# Patient Record
Sex: MALE | Race: WHITE | NOT HISPANIC OR LATINO | Employment: OTHER | URBAN - METROPOLITAN AREA
[De-identification: names, ages, dates, MRNs, and addresses within clinical notes are randomized per-mention and may not be internally consistent; named-entity substitution may affect disease eponyms.]

---

## 2017-05-08 ENCOUNTER — HISTORICAL (OUTPATIENT)
Dept: ADMINISTRATIVE | Facility: HOSPITAL | Age: 72
End: 2017-05-08

## 2017-05-08 LAB
ALBUMIN SERPL-MCNC: 4.1 G/DL (ref 3.1–4.7)
ALP SERPL-CCNC: 61 IU/L (ref 40–104)
ALT (SGPT): 18 IU/L (ref 3–33)
AST SERPL-CCNC: 27 IU/L (ref 10–40)
BASOPHILS NFR BLD: 0.1 K/UL (ref 0–0.2)
BASOPHILS NFR BLD: 1.6 %
BILIRUB SERPL-MCNC: 0.8 MG/DL (ref 0.3–1)
BUN SERPL-MCNC: 19 MG/DL (ref 8–20)
CALCIUM SERPL-MCNC: 8.8 MG/DL (ref 7.7–10.4)
CHLORIDE: 105 MMOL/L (ref 98–110)
CO2 SERPL-SCNC: 29.3 MMOL/L (ref 22.8–31.6)
CREATININE: 0.81 MG/DL (ref 0.6–1.4)
CRP SERPL-MCNC: 0.06 MG/DL (ref 0–1.4)
EOSINOPHIL NFR BLD: 0.1 K/UL (ref 0–0.7)
EOSINOPHIL NFR BLD: 1.7 %
ERYTHROCYTE [DISTWIDTH] IN BLOOD BY AUTOMATED COUNT: 12.4 % (ref 11.7–14.9)
GLUCOSE: 81 MG/DL (ref 70–99)
GRAN #: 2.6 K/UL (ref 1.4–6.5)
GRAN%: 50.6 %
HCT VFR BLD AUTO: 41.9 % (ref 39–55)
HGB BLD-MCNC: 14.4 G/DL (ref 14–16)
IMMATURE GRANS (ABS): 0 K/UL (ref 0–1)
IMMATURE GRANULOCYTES: 0.4 %
LYMPH #: 1.8 K/UL (ref 1.2–3.4)
LYMPH%: 34.1 %
MCH RBC QN AUTO: 34.5 PG (ref 25–35)
MCHC RBC AUTO-ENTMCNC: 34.4 G/DL (ref 31–36)
MCV RBC AUTO: 100.5 FL (ref 80–100)
MONO #: 0.6 K/UL (ref 0.1–0.6)
MONO%: 11.6 %
NUCLEATED RBCS: 0 %
PLATELET # BLD AUTO: 142 K/UL (ref 140–440)
PMV BLD AUTO: 13.3 FL (ref 8.8–12.7)
POTASSIUM SERPL-SCNC: 4 MMOL/L (ref 3.5–5)
PROT SERPL-MCNC: 7 G/DL (ref 6–8.2)
RBC # BLD AUTO: 4.17 M/UL (ref 4.3–5.9)
SODIUM: 141 MMOL/L (ref 134–144)
WBC # BLD AUTO: 5.2 K/UL (ref 5–10)

## 2017-10-16 ENCOUNTER — OFFICE VISIT (OUTPATIENT)
Dept: RHEUMATOLOGY | Facility: CLINIC | Age: 72
End: 2017-10-16
Payer: MEDICARE

## 2017-10-16 VITALS
BODY MASS INDEX: 28.96 KG/M2 | DIASTOLIC BLOOD PRESSURE: 80 MMHG | SYSTOLIC BLOOD PRESSURE: 138 MMHG | WEIGHT: 218.5 LBS | HEIGHT: 73 IN

## 2017-10-16 DIAGNOSIS — Z79.899 ENCOUNTER FOR LONG-TERM (CURRENT) DRUG USE: ICD-10-CM

## 2017-10-16 DIAGNOSIS — M35.00 SJOGREN'S SYNDROME, WITH UNSPECIFIED ORGAN INVOLVEMENT: ICD-10-CM

## 2017-10-16 DIAGNOSIS — M35.9 CONNECTIVE TISSUE DISEASE, UNDIFFERENTIATED: Primary | ICD-10-CM

## 2017-10-16 LAB
ALBUMIN SERPL-MCNC: 3.8 G/DL (ref 3.1–4.7)
ALP SERPL-CCNC: 64 IU/L (ref 40–104)
ALT (SGPT): 21 IU/L (ref 3–33)
AST SERPL-CCNC: 28 IU/L (ref 10–40)
BASOPHILS NFR BLD: 0.1 K/UL (ref 0–0.2)
BASOPHILS NFR BLD: 1.4 %
BILIRUB SERPL-MCNC: 0.8 MG/DL (ref 0.3–1)
BUN SERPL-MCNC: 15 MG/DL (ref 8–20)
CALCIUM SERPL-MCNC: 9 MG/DL (ref 7.7–10.4)
CHLORIDE: 106 MMOL/L (ref 98–110)
CO2 SERPL-SCNC: 29.8 MMOL/L (ref 22.8–31.6)
CREATININE: 0.81 MG/DL (ref 0.6–1.4)
CRP SERPL-MCNC: 0.17 MG/DL (ref 0–1.4)
EOSINOPHIL NFR BLD: 0.2 K/UL (ref 0–0.7)
EOSINOPHIL NFR BLD: 3.3 %
ERYTHROCYTE [DISTWIDTH] IN BLOOD BY AUTOMATED COUNT: 12.9 % (ref 11.7–14.9)
GLUCOSE: 80 MG/DL (ref 70–99)
GRAN #: 2.6 K/UL (ref 1.4–6.5)
GRAN%: 51.3 %
HCT VFR BLD AUTO: 40.8 % (ref 39–55)
HGB BLD-MCNC: 14 G/DL (ref 14–16)
IMMATURE GRANS (ABS): 0 K/UL (ref 0–1)
IMMATURE GRANULOCYTES: 0.4 %
LYMPH #: 1.6 K/UL (ref 1.2–3.4)
LYMPH%: 32.2 %
MCH RBC QN AUTO: 35.4 PG (ref 25–35)
MCHC RBC AUTO-ENTMCNC: 34.3 G/DL (ref 31–36)
MCV RBC AUTO: 103 FL (ref 80–100)
MONO #: 0.6 K/UL (ref 0.1–0.6)
MONO%: 11.4 %
NUCLEATED RBCS: 0 %
PLATELET # BLD AUTO: 140 K/UL (ref 140–440)
PMV BLD AUTO: 13.1 FL (ref 8.8–12.7)
POTASSIUM SERPL-SCNC: 4.3 MMOL/L (ref 3.5–5)
PROT SERPL-MCNC: 6.5 G/DL (ref 6–8.2)
RBC # BLD AUTO: 3.96 M/UL (ref 4.3–5.9)
SODIUM: 143 MMOL/L (ref 134–144)
WBC # BLD AUTO: 5.1 K/UL (ref 5–10)

## 2017-10-16 PROCEDURE — 99213 OFFICE O/P EST LOW 20 MIN: CPT | Mod: ,,, | Performed by: INTERNAL MEDICINE

## 2017-10-16 NOTE — PROGRESS NOTES
[facility] RHEUMATOLOGY           Follow-up visit      Subjective:       Patient ID:   NAME: Nigel Marcus Jr. : 1945     71 y.o. male    Referring Doc: No ref. provider found  Other Physicians:    Chief Complaint:  Sjogren's syndrome      HPI/Interval History:    The patient has no interval complaints. He has not experienced any significant dryness of the eyes or of the mouth. He has had no pulmonary or GI complaints.          ROS:   GEN: no fever, night sweats or weight loss  SKIN:  no rashes , erythema, bruising, or swelling, no Raynauds, no photosensitivity  HEENT: no HAs, no changes in vision, no mouth ulcers, no sicca symptoms, no scalp tenderness, jaw claudication.  CV: no CP, SOB, PND, CHAPARRO or orthopnea,no palpitations  PULM: no SOB, cough, hemoptysis, sputum or pleuritic pain  GI: no abdominal pain, nausea, vomiting, constipation, diarrhea, melanotic stools, BRBPR, or hematemesis.no dysphagia  : no hematuria, dysuria  NEURO:no paresthesias, headaches, visual disturbances, muscle weakness  MUSCULOSKELETAL:  no complaints of joint or muscle pain  PSYCH: No insomnia, no significant depression, no anxiety    Medications:    Current Outpatient Prescriptions:     cevimeline (EVOXAC) 30 mg capsule, Take 30 mg by mouth 2 (two) times daily.  , Disp: , Rfl:     cycloSPORINE (RESTASIS) 0.05 % ophthalmic emulsion, Place 1 drop into both eyes 2 (two) times daily.  , Disp: , Rfl:     hydroxychloroquine (PLAQUENIL) 200 mg tablet, Take 200 mg by mouth once daily.  , Disp: , Rfl:     hyoscyamine (SYMAX-SL) 0.125 mg Subl, SYMAX-SL 0.125 MG SUBL, Disp: , Rfl:     latanoprost (XALATAN) 0.005 % ophthalmic solution, Place 1 drop into both eyes every evening.  , Disp: , Rfl:     omeprazole (PRILOSEC) 20 MG capsule, Take 20 mg by mouth once daily.  , Disp: , Rfl:     VOLTAREN 1 % Gel, , Disp: , Rfl: 3  FAMILY HISTORY: negative for Connective Tissue Disease        Review of patient's allergies indicates:   Allergen  "Reactions    Sulfa (sulfonamide antibiotics) Other (See Comments)     Ringing in ears             Objective:     Vitals:  Blood pressure 138/80, height 6' 1" (1.854 m), weight 99.1 kg (218 lb 8 oz).    Physical Examination:   GEN: wn/wd male in no apparent distress; AAOx3  SKIN: no rashes, no lesions, no sclerodactyly, no Raynaud's, no periungual erythema  HEAD: no alopecia, no scalp tenderness, no temporal artery tenderness or induration.  EYES: no pallor, no icterus, PERRLA  ENT:  no thrush, no mucosal dryness or ulcerations, adequate oral hygiene & dentition.  NECK: supple x 6, no masses, no thyromegaly, no lymphadenopathy.  CV:   S1 and S2 regular, no murmurs, gallop or rubs  CHEST: Normal respiratory effort;  normal breath sounds/no adventitious sounds. No signs of consolidation.  ABD: non-tender and non-distended; soft; normal bowel sounds; no rebound/guarding or tenderness. No hepatosplenomegaly.  Musculoskeletal:  No evidence of any inflammatory arthritis or of any significant degenerative disease  EXTREM: no clubbing, cyanosis or edema. normal pulses.  NEURO: grossly intact; motor/sensory WNL; AAOx3; no tremors  PSYCH: normal mood, affect and behavior            Labs:   Lab Results   Component Value Date    WBC 5.2 05/08/2017    HGB 14.4 05/08/2017    HCT 41.9 05/08/2017    .5 (H) 05/08/2017     05/08/2017   CMP@  Sodium   Date Value Ref Range Status   09/05/2017 141 136 - 145 mmol/L Final   05/08/2017 141 134 - 144 mmol/L      Potassium   Date Value Ref Range Status   09/05/2017 4.4 3.5 - 5.1 mmol/L Final   08/31/2015 4.3 3.5 - 5.1 MMOL/L Final     Chloride   Date Value Ref Range Status   09/05/2017 105 95 - 110 mmol/L Final   05/08/2017 105 98 - 110 mmol/L      CO2   Date Value Ref Range Status   09/05/2017 29 22 - 31 mmol/L Final     Glucose   Date Value Ref Range Status   09/05/2017 82 70 - 110 mg/dL Final     Comment:     The ADA recommends the following guidelines for fasting " glucose:  Normal:       less than 100 mg/dL  Prediabetes:  100 mg/dL to 125 mg/dL  Diabetes:     126 mg/dL or higher     05/08/2017 81 70 - 99 mg/dL      BUN, Bld   Date Value Ref Range Status   09/05/2017 17 9 - 21 mg/dL Final     Creatinine   Date Value Ref Range Status   09/05/2017 0.89 0.50 - 1.40 mg/dL Final   05/08/2017 0.81 0.60 - 1.40 mg/dL      Calcium   Date Value Ref Range Status   09/05/2017 9.0 8.4 - 10.2 mg/dL Final     Total Protein   Date Value Ref Range Status   09/05/2017 6.4 6.0 - 8.4 g/dL Final     Albumin   Date Value Ref Range Status   09/05/2017 3.7 3.5 - 5.2 g/dL Final   05/08/2017 4.1 3.1 - 4.7 g/dL      Total Bilirubin   Date Value Ref Range Status   09/05/2017 0.8 0.2 - 1.3 mg/dL Final     Alkaline Phosphatase   Date Value Ref Range Status   09/05/2017 69 38 - 145 U/L Final     AST   Date Value Ref Range Status   09/05/2017 29 17 - 59 U/L Final     ALT   Date Value Ref Range Status   09/05/2017 37 10 - 44 U/L Final     CRP   Date Value Ref Range Status   05/08/2017 0.06 0.00 - 1.40 mg/dL          Radiology/Diagnostic Studies:    No x-rays are available    Assessment/Discussion/Plan:   71 y.o. male with Sjogren's-stable on hydroxychloroquine         PLAN:   I will continue his medication without change. Routine blood testing was ordered today.      RTC:   I will see him back in 4 months.      Electronically signed by Nic Piña MD

## 2018-01-18 RX ORDER — HYDROXYCHLOROQUINE SULFATE 200 MG/1
TABLET, FILM COATED ORAL
Qty: 90 TABLET | Refills: 3 | Status: SHIPPED | OUTPATIENT
Start: 2018-01-18 | End: 2019-02-01 | Stop reason: SDUPTHER

## 2018-02-26 ENCOUNTER — OFFICE VISIT (OUTPATIENT)
Dept: RHEUMATOLOGY | Facility: CLINIC | Age: 73
End: 2018-02-26
Payer: MEDICARE

## 2018-02-26 VITALS
BODY MASS INDEX: 29.03 KG/M2 | DIASTOLIC BLOOD PRESSURE: 88 MMHG | SYSTOLIC BLOOD PRESSURE: 158 MMHG | HEIGHT: 73 IN | WEIGHT: 219 LBS

## 2018-02-26 DIAGNOSIS — Z79.899 ENCOUNTER FOR LONG-TERM (CURRENT) DRUG USE: ICD-10-CM

## 2018-02-26 DIAGNOSIS — M35.00 SJOGREN'S SYNDROME, WITH UNSPECIFIED ORGAN INVOLVEMENT: Primary | ICD-10-CM

## 2018-02-26 DIAGNOSIS — I49.3 PVC (PREMATURE VENTRICULAR CONTRACTION): ICD-10-CM

## 2018-02-26 DIAGNOSIS — K22.4 ESOPHAGEAL SPASM: ICD-10-CM

## 2018-02-26 LAB
ALBUMIN SERPL-MCNC: 3.8 G/DL (ref 3.1–4.7)
ALP SERPL-CCNC: 71 IU/L (ref 40–104)
ALT (SGPT): 24 IU/L (ref 3–33)
AST SERPL-CCNC: 27 IU/L (ref 10–40)
BASOPHILS NFR BLD: 0.1 K/UL (ref 0–0.2)
BASOPHILS NFR BLD: 1.2 %
BILIRUB SERPL-MCNC: 0.9 MG/DL (ref 0.3–1)
BUN SERPL-MCNC: 16 MG/DL (ref 8–20)
CALCIUM SERPL-MCNC: 9.1 MG/DL (ref 7.7–10.4)
CHLORIDE: 106 MMOL/L (ref 98–110)
CO2 SERPL-SCNC: 29.5 MMOL/L (ref 22.8–31.6)
CREATININE: 0.81 MG/DL (ref 0.6–1.4)
CRP SERPL-MCNC: 0.07 MG/DL (ref 0–1.4)
EOSINOPHIL NFR BLD: 0.1 K/UL (ref 0–0.7)
EOSINOPHIL NFR BLD: 2 %
ERYTHROCYTE [DISTWIDTH] IN BLOOD BY AUTOMATED COUNT: 12.2 % (ref 11.7–14.9)
GLUCOSE: 78 MG/DL (ref 70–99)
GRAN #: 2.9 K/UL (ref 1.4–6.5)
GRAN%: 56.9 %
HCT VFR BLD AUTO: 42.8 % (ref 39–55)
HGB BLD-MCNC: 14.9 G/DL (ref 14–16)
IMMATURE GRANS (ABS): 0 K/UL (ref 0–1)
IMMATURE GRANULOCYTES: 0.2 %
LYMPH #: 1.5 K/UL (ref 1.2–3.4)
LYMPH%: 30.1 %
MCH RBC QN AUTO: 35.2 PG (ref 25–35)
MCHC RBC AUTO-ENTMCNC: 34.8 G/DL (ref 31–36)
MCV RBC AUTO: 101.2 FL (ref 80–100)
MONO #: 0.5 K/UL (ref 0.1–0.6)
MONO%: 9.6 %
NUCLEATED RBCS: 0 %
PLATELET # BLD AUTO: 151 K/UL (ref 140–440)
PMV BLD AUTO: 13.1 FL (ref 8.8–12.7)
POTASSIUM SERPL-SCNC: 4.1 MMOL/L (ref 3.5–5)
PROT SERPL-MCNC: 6.4 G/DL (ref 6–8.2)
RBC # BLD AUTO: 4.23 M/UL (ref 4.3–5.9)
SODIUM: 140 MMOL/L (ref 134–144)
WBC # BLD AUTO: 5 K/UL (ref 5–10)

## 2018-02-26 PROCEDURE — 1126F AMNT PAIN NOTED NONE PRSNT: CPT | Mod: ,,, | Performed by: INTERNAL MEDICINE

## 2018-02-26 PROCEDURE — 99213 OFFICE O/P EST LOW 20 MIN: CPT | Mod: ,,, | Performed by: INTERNAL MEDICINE

## 2018-02-26 PROCEDURE — 1159F MED LIST DOCD IN RCRD: CPT | Mod: ,,, | Performed by: INTERNAL MEDICINE

## 2018-02-26 NOTE — PROGRESS NOTES
[facility] RHEUMATOLOGY           Follow-up visit      Subjective:       Patient ID:   NAME: Nigel Marcus Jr. : 1945     72 y.o. male    Referring Doc: No ref. provider found  Other Physicians:    Chief Complaint:  No chief complaint on file.      HPI/Interval History:   The patient is basically doing well. He is a bit concerned about his recent diagnosis of PVCs. He did have a full cardio workup and nothing more serious was revealed and no antiarrhythmic medication was prescribed. He is not having any difficulty with his Sjogren's.  He has also been diagnosed with esophageal spasm. The gastroenterologist placed him on doxepin which is apparently helpful.        ROS:   GEN:    no fever, night sweats or weight loss  SKIN:   no rashes , erythema, bruising, or swelling, no Raynauds, no photosensitivity  HEENT: no HAs, no changes in vision, no mouth ulcers, no sicca symptoms, no scalp tenderness, jaw claudication.  CV:      no CP, SOB, PND, CHAPARRO or orthopnea, occ palpitations  PULM: no SOB, cough, hemoptysis, sputum or pleuritic pain  GI:      no abdominal pain, nausea, vomiting, constipation, diarrhea, melanotic stools, BRBPR, or hematemesis, no dysphagia, no GERD  :     no hematuria, dysuria  NEURO: no paresthesias, headaches, visual disturbances  MUSCULOSKELETAL:  No muscle or joint pain  PSYCH:   No insomnia, no significant depression, no anxiety    Medications:    Current Outpatient Prescriptions:     cevimeline (EVOXAC) 30 mg capsule, Take 30 mg by mouth 2 (two) times daily.  , Disp: , Rfl:     cycloSPORINE (RESTASIS) 0.05 % ophthalmic emulsion, Place 1 drop into both eyes 2 (two) times daily.  , Disp: , Rfl:     doxepin (SINEQUAN) 10 MG capsule, Take 10 mg by mouth every evening., Disp: , Rfl:     hydroxychloroquine (PLAQUENIL) 200 mg tablet, TAKE 1 TABLET DAILY, Disp: 90 tablet, Rfl: 3    hyoscyamine (LEVSIN/SL) 0.125 mg Subl, Place 0.125 mg under the tongue every 4 (four) hours as needed., Disp:  ", Rfl:     latanoprost (XALATAN) 0.005 % ophthalmic solution, Place 1 drop into both eyes every evening.  , Disp: , Rfl:     metoprolol succinate (TOPROL-XL) 25 MG 24 hr tablet, Take 1 tablet (25 mg total) by mouth once daily., Disp: 90 tablet, Rfl: 1    MULTIVITAMIN/IRON/FOLIC ACID (CENTRUM COMPLETE ORAL), Take 1 tablet by mouth once daily., Disp: , Rfl:     omega-3 fatty acids (FISH OIL) 500 mg Cap, Take by mouth., Disp: , Rfl:     omeprazole (PRILOSEC) 20 MG capsule, Take 20 mg by mouth once daily.  , Disp: , Rfl:     vitamin E 400 UNIT capsule, Take 400 Units by mouth once daily., Disp: , Rfl:     VOLTAREN 1 % Gel, , Disp: , Rfl: 3    zinc gluconate 50 mg tablet, Take 50 mg by mouth once daily., Disp: , Rfl:       FAMILY HISTORY: negative for Connective Tissue Disease        Review of patient's allergies indicates:   Allergen Reactions    Sulfa (sulfonamide antibiotics) Other (See Comments)     Ringing in ears             Objective:     Vitals:  Blood pressure (!) 158/88, height 6' 1" (1.854 m), weight 99.3 kg (219 lb).    Physical Examination:   GEN: wn/wd male in no apparent distress  SKIN: no rashes, no lesions, no sclerodactyly, no Raynaud's, no periungual erythema  HEAD: no alopecia, no scalp tenderness, no temporal artery tenderness or induration.  EYES: no pallor, no icterus, PERRLA  ENT:  no thrush, no mucosal dryness or ulcerations, adequate oral hygiene & dentition.  NECK: supple x 6, no masses, no thyromegaly, no lymphadenopathy.  CV:   S1 and S2 regular, no murmurs, gallop or rubs  CHEST: Normal respiratory effort;  normal breath sounds/no adventitious sounds. No signs of consolidation.  ABD: non-tender and non-distended; soft; normal bowel sounds; no rebound/guarding or tenderness. No hepatosplenomegaly.  Musculoskeletal:  No evidence of inflammatory or significant degenerative arthritis  EXTREM: no clubbing, cyanosis or edema. normal pulses.  NEURO: grossly intact; motor/sensory WNL; " AAOx3; no tremors  PSYCH:  normal mood, affect and behavior            Labs:   Lab Results   Component Value Date    WBC 5.1 10/16/2017    HGB 14.0 10/16/2017    HCT 40.8 10/16/2017    .0 (H) 10/16/2017     10/16/2017   CMP@  Sodium   Date Value Ref Range Status   10/16/2017 143 134 - 144 mmol/L      Potassium   Date Value Ref Range Status   10/16/2017 4.3 3.5 - 5.0 mmol/L    08/31/2015 4.3 3.5 - 5.1 MMOL/L Final     Chloride   Date Value Ref Range Status   10/16/2017 106 98 - 110 mmol/L      CO2   Date Value Ref Range Status   10/16/2017 29.8 22.8 - 31.6 mmol/L      Glucose   Date Value Ref Range Status   10/16/2017 80 70 - 99 mg/dL      BUN, Bld   Date Value Ref Range Status   10/16/2017 15 8 - 20 mg/dL      Creatinine   Date Value Ref Range Status   10/16/2017 0.81 0.60 - 1.40 mg/dL      Calcium   Date Value Ref Range Status   10/16/2017 9.0 7.7 - 10.4 mg/dL      Total Protein   Date Value Ref Range Status   10/16/2017 6.5 6.0 - 8.2 g/dL      Albumin   Date Value Ref Range Status   10/16/2017 3.8 3.1 - 4.7 g/dL      Total Bilirubin   Date Value Ref Range Status   10/16/2017 0.8 0.3 - 1.0 mg/dL      Alkaline Phosphatase   Date Value Ref Range Status   10/16/2017 64 40 - 104 IU/L      AST   Date Value Ref Range Status   10/16/2017 28 10 - 40 IU/L      ALT   Date Value Ref Range Status   09/05/2017 37 10 - 44 U/L Final     CRP   Date Value Ref Range Status   10/16/2017 0.17 0.00 - 1.40 mg/dL          Radiology/Diagnostic Studies:    none    Assessment/Discussion/Plan:   72 y.o. male with Sjogren's syndrome-well controlled with Plaquenil plus Evoxac      PLAN:  We will obtain routine blood testing. I will continue his medications unchanged.      RTC:  I will see him back in 4 months.      Electronically signed by Nic Piña MD

## 2018-06-25 ENCOUNTER — OFFICE VISIT (OUTPATIENT)
Dept: RHEUMATOLOGY | Facility: CLINIC | Age: 73
End: 2018-06-25
Payer: MEDICARE

## 2018-06-25 VITALS
DIASTOLIC BLOOD PRESSURE: 82 MMHG | BODY MASS INDEX: 29.65 KG/M2 | SYSTOLIC BLOOD PRESSURE: 122 MMHG | WEIGHT: 224.69 LBS

## 2018-06-25 DIAGNOSIS — Z79.899 ENCOUNTER FOR LONG-TERM (CURRENT) DRUG USE: ICD-10-CM

## 2018-06-25 DIAGNOSIS — M35.00 SJOGREN'S SYNDROME, WITH UNSPECIFIED ORGAN INVOLVEMENT: Primary | ICD-10-CM

## 2018-06-25 LAB
ALBUMIN SERPL-MCNC: 4 G/DL (ref 3.1–4.7)
ALP SERPL-CCNC: 67 IU/L (ref 40–104)
ALT (SGPT): 16 IU/L (ref 3–33)
AST SERPL-CCNC: 25 IU/L (ref 10–40)
BASOPHILS NFR BLD: 0.1 K/UL (ref 0–0.2)
BASOPHILS NFR BLD: 1.1 %
BILIRUB SERPL-MCNC: 1.1 MG/DL (ref 0.3–1)
BUN SERPL-MCNC: 14 MG/DL (ref 8–20)
CALCIUM SERPL-MCNC: 8.8 MG/DL (ref 7.7–10.4)
CHLORIDE: 104 MMOL/L (ref 98–110)
CO2 SERPL-SCNC: 28.5 MMOL/L (ref 22.8–31.6)
CREATININE: 0.73 MG/DL (ref 0.6–1.4)
CRP SERPL-MCNC: 0.1 MG/DL (ref 0–1.4)
EOSINOPHIL NFR BLD: 0.1 K/UL (ref 0–0.7)
EOSINOPHIL NFR BLD: 1.9 %
ERYTHROCYTE [DISTWIDTH] IN BLOOD BY AUTOMATED COUNT: 12.6 % (ref 11.7–14.9)
GLUCOSE: 79 MG/DL (ref 70–99)
GRAN #: 3.6 K/UL (ref 1.4–6.5)
GRAN%: 56.3 %
HCT VFR BLD AUTO: 44.3 % (ref 39–55)
HGB BLD-MCNC: 15.4 G/DL (ref 14–16)
IMMATURE GRANS (ABS): 0 K/UL (ref 0–1)
IMMATURE GRANULOCYTES: 0.3 %
LYMPH #: 2 K/UL (ref 1.2–3.4)
LYMPH%: 30.9 %
MCH RBC QN AUTO: 35.2 PG (ref 25–35)
MCHC RBC AUTO-ENTMCNC: 34.8 G/DL (ref 31–36)
MCV RBC AUTO: 101.4 FL (ref 80–100)
MONO #: 0.6 K/UL (ref 0.1–0.6)
MONO%: 9.5 %
NUCLEATED RBCS: 0 %
PLATELET # BLD AUTO: 153 K/UL (ref 140–440)
PMV BLD AUTO: 13.1 FL (ref 8.8–12.7)
POTASSIUM SERPL-SCNC: 4.2 MMOL/L (ref 3.5–5)
PROT SERPL-MCNC: 6.8 G/DL (ref 6–8.2)
RBC # BLD AUTO: 4.37 M/UL (ref 4.3–5.9)
SODIUM: 139 MMOL/L (ref 134–144)
WBC # BLD AUTO: 6.4 K/UL (ref 5–10)

## 2018-06-25 PROCEDURE — 99213 OFFICE O/P EST LOW 20 MIN: CPT | Mod: ,,, | Performed by: INTERNAL MEDICINE

## 2018-06-25 NOTE — PROGRESS NOTES
[facility] RHEUMATOLOGY           Follow-up visit    Notes dictated via Dragon to EPIC. Please forgive any unintentional errors.  Subjective:       Patient ID:   NAME: Nigel Marcus Jr. : 1945     72 y.o. male    Referring Doc: No ref. provider found  Other Physicians:    Chief Complaint:  Sjogren's syndrome (Takes Plaquenil 200mg QD)      HPI/Interval History:   The patient is doing very well. He has no complaints whatsoever about dryness of the eyes or mouth      ROS:   GEN:    no fever, night sweats or weight loss  SKIN:   no rashes , erythema, bruising, or swelling, no Raynauds, no photosensitivity  HEENT: no changes in vision, no mouth ulcers, no sicca symptoms, no scalp tenderness, no jaw claudication.  CV:      no CP, PND, CHAPARRO or orthopnea, no palpitations  PULM: no SOB, cough, hemoptysis, sputum or pleuritic pain  GI:       no abdominal pain, nausea, vomiting, constipation, diarrhea, melanotic stools, BRBPR, or hematemesis, no dysphagia, no GERD  :     no hematuria, dysuria  NEURO: no paresthesias, headaches, acute visual disturbances  MUSCULOSKELETAL:  No muscle or joint complaints  PSYCH:   No insomnia, no significant anxiety or depression    Medications:    Current Outpatient Prescriptions:     cevimeline (EVOXAC) 30 mg capsule, Take 30 mg by mouth 2 (two) times daily.  , Disp: , Rfl:     cycloSPORINE (RESTASIS) 0.05 % ophthalmic emulsion, Place 1 drop into both eyes 2 (two) times daily.  , Disp: , Rfl:     doxepin (SINEQUAN) 10 MG capsule, Take 10 mg by mouth every evening., Disp: , Rfl:     hydroxychloroquine (PLAQUENIL) 200 mg tablet, TAKE 1 TABLET DAILY, Disp: 90 tablet, Rfl: 3    hyoscyamine (LEVSIN/SL) 0.125 mg Subl, Place 0.125 mg under the tongue every 4 (four) hours as needed., Disp: , Rfl:     latanoprost (XALATAN) 0.005 % ophthalmic solution, Place 1 drop into both eyes every evening.  , Disp: , Rfl:     metoprolol succinate (TOPROL-XL) 25 MG 24 hr tablet, Take 1 tablet (25 mg  total) by mouth once daily., Disp: 90 tablet, Rfl: 1    MULTIVITAMIN/IRON/FOLIC ACID (CENTRUM COMPLETE ORAL), Take 1 tablet by mouth once daily., Disp: , Rfl:     omega-3 fatty acids (FISH OIL) 500 mg Cap, Take by mouth., Disp: , Rfl:     omeprazole (PRILOSEC) 20 MG capsule, Take 20 mg by mouth once daily.  , Disp: , Rfl:     vitamin E 400 UNIT capsule, Take 400 Units by mouth once daily., Disp: , Rfl:     VOLTAREN 1 % Gel, , Disp: , Rfl: 3    zinc gluconate 50 mg tablet, Take 50 mg by mouth once daily., Disp: , Rfl:       FAMILY HISTORY: negative for Connective Tissue Disease        Review of patient's allergies indicates:   Allergen Reactions    Sulfa (sulfonamide antibiotics) Other (See Comments)     Ringing in ears             Objective:     Vitals:  Blood pressure 122/82, weight 101.9 kg (224 lb 11.2 oz).    Physical Examination:   GEN: wn/wd male in no apparent distress  SKIN: no rashes, no lesions, no sclerodactyly, no Raynaud's, no periungual erythema  HEAD: no alopecia, no scalp tenderness, no temporal artery tenderness or induration.  EYES: no pallor, no icterus, PERRLA  ENT:  no thrush, no mucosal dryness or ulcerations, adequate oral hygiene & dentition.  NECK: supple x 6, no masses, no thyromegaly, no lymphadenopathy.  CV:   S1 and S2 regular, no murmurs, gallop or rubs  CHEST: Normal respiratory effort;  normal breath sounds/no adventitious sounds. No signs of consolidation.  ABD: non-tender and non-distended; soft; normal bowel sounds; no rebound/guarding or tenderness. No hepatosplenomegaly.  Musculoskeletal:  no evidence of inflammatory arthritis or of progressive degenerative change  EXTREM: no clubbing, cyanosis or edema. normal pulses.  NEURO: grossly intact; motor/sensory WNL; no tremors  PSYCH:  normal mood, affect and behavior            Labs:   Lab Results   Component Value Date    WBC 5.0 02/26/2018    HGB 14.9 02/26/2018    HCT 42.8 02/26/2018    .2 (H) 02/26/2018      02/26/2018   CMP@  Sodium   Date Value Ref Range Status   02/26/2018 140 134 - 144 mmol/L      Potassium   Date Value Ref Range Status   02/26/2018 4.1 3.5 - 5.0 mmol/L    08/31/2015 4.3 3.5 - 5.1 MMOL/L Final     Chloride   Date Value Ref Range Status   02/26/2018 106 98 - 110 mmol/L      CO2   Date Value Ref Range Status   02/26/2018 29.5 22.8 - 31.6 mmol/L      Glucose   Date Value Ref Range Status   02/26/2018 78 70 - 99 mg/dL      BUN, Bld   Date Value Ref Range Status   02/26/2018 16 8 - 20 mg/dL      Creatinine   Date Value Ref Range Status   02/26/2018 0.81 0.60 - 1.40 mg/dL      Calcium   Date Value Ref Range Status   02/26/2018 9.1 7.7 - 10.4 mg/dL      Total Protein   Date Value Ref Range Status   02/26/2018 6.4 6.0 - 8.2 g/dL      Albumin   Date Value Ref Range Status   02/26/2018 3.8 3.1 - 4.7 g/dL      Total Bilirubin   Date Value Ref Range Status   02/26/2018 0.9 0.3 - 1.0 mg/dL      Alkaline Phosphatase   Date Value Ref Range Status   02/26/2018 71 40 - 104 IU/L      AST   Date Value Ref Range Status   02/26/2018 27 10 - 40 IU/L      ALT   Date Value Ref Range Status   09/05/2017 37 10 - 44 U/L Final     CRP   Date Value Ref Range Status   02/26/2018 0.07 0.00 - 1.40 mg/dL          Radiology/Diagnostic Studies:    none    Assessment/Discussion/Plan:   72 y.o. male with Sjogren's syndrome-well controlled with Plaquenil and Evoxac      PLAN:  I will continue his medications without change. Routine blood testing was obtained.      RTC:  I will see him back in 4 months.      Electronically signed by Nic Piña MD

## 2018-10-10 PROBLEM — I49.1 SUPRAVENTRICULAR PREMATURE BEATS: Status: ACTIVE | Noted: 2018-10-10

## 2018-10-10 PROBLEM — I49.3 PVC (PREMATURE VENTRICULAR CONTRACTION): Status: ACTIVE | Noted: 2018-10-10

## 2018-10-29 ENCOUNTER — OFFICE VISIT (OUTPATIENT)
Dept: RHEUMATOLOGY | Facility: CLINIC | Age: 73
End: 2018-10-29
Payer: MEDICARE

## 2018-10-29 VITALS
HEART RATE: 75 BPM | DIASTOLIC BLOOD PRESSURE: 74 MMHG | BODY MASS INDEX: 30.2 KG/M2 | SYSTOLIC BLOOD PRESSURE: 127 MMHG | WEIGHT: 228.88 LBS

## 2018-10-29 DIAGNOSIS — M35.00 SJOGREN'S SYNDROME WITHOUT EXTRAGLANDULAR INVOLVEMENT: Primary | ICD-10-CM

## 2018-10-29 DIAGNOSIS — Z79.899 ENCOUNTER FOR LONG-TERM (CURRENT) DRUG USE: ICD-10-CM

## 2018-10-29 LAB
ALBUMIN SERPL-MCNC: 3.8 G/DL (ref 3.1–4.7)
ALP SERPL-CCNC: 66 IU/L (ref 40–104)
ALT (SGPT): 15 IU/L (ref 3–33)
AST SERPL-CCNC: 22 IU/L (ref 10–40)
BASOPHILS NFR BLD: 0.1 K/UL (ref 0–0.2)
BASOPHILS NFR BLD: 1.2 %
BILIRUB SERPL-MCNC: 1 MG/DL (ref 0.3–1)
BUN SERPL-MCNC: 16 MG/DL (ref 8–20)
CALCIUM SERPL-MCNC: 8.8 MG/DL (ref 7.7–10.4)
CHLORIDE: 104 MMOL/L (ref 98–110)
CO2 SERPL-SCNC: 27.6 MMOL/L (ref 22.8–31.6)
CREATININE: 0.92 MG/DL (ref 0.6–1.4)
CRP SERPL-MCNC: 0.07 MG/DL (ref 0–1.4)
EOSINOPHIL NFR BLD: 0.1 K/UL (ref 0–0.7)
EOSINOPHIL NFR BLD: 2.4 %
ERYTHROCYTE [DISTWIDTH] IN BLOOD BY AUTOMATED COUNT: 12.6 % (ref 11.7–14.9)
GLUCOSE: 84 MG/DL (ref 70–99)
GRAN #: 2.7 K/UL (ref 1.4–6.5)
GRAN%: 53.9 %
HCT VFR BLD AUTO: 44.6 % (ref 39–55)
HGB BLD-MCNC: 15.1 G/DL (ref 14–16)
IMMATURE GRANS (ABS): 0 K/UL (ref 0–1)
IMMATURE GRANULOCYTES: 0.2 %
LYMPH #: 1.6 K/UL (ref 1.2–3.4)
LYMPH%: 32.1 %
MCH RBC QN AUTO: 34.3 PG (ref 25–35)
MCHC RBC AUTO-ENTMCNC: 33.9 G/DL (ref 31–36)
MCV RBC AUTO: 101.4 FL (ref 80–100)
MONO #: 0.5 K/UL (ref 0.1–0.6)
MONO%: 10.2 %
NUCLEATED RBCS: 0 %
PLATELET # BLD AUTO: 145 K/UL (ref 140–440)
PMV BLD AUTO: 13.1 FL (ref 8.8–12.7)
POTASSIUM SERPL-SCNC: 4.6 MMOL/L (ref 3.5–5)
PROT SERPL-MCNC: 6.4 G/DL (ref 6–8.2)
RBC # BLD AUTO: 4.4 M/UL (ref 4.3–5.9)
SODIUM: 140 MMOL/L (ref 134–144)
WBC # BLD AUTO: 5 K/UL (ref 5–10)

## 2018-10-29 PROCEDURE — 99213 OFFICE O/P EST LOW 20 MIN: CPT | Mod: ,,, | Performed by: INTERNAL MEDICINE

## 2018-10-29 NOTE — PROGRESS NOTES
[facility] RHEUMATOLOGY           Follow-up visit    Notes dictated via Dragon to EPIC. Please forgive any unintentional errors.  Subjective:       Patient ID:   NAME: Nigel Marcus Jr. : 1945     72 y.o. male    Referring Doc: No ref. provider found  Other Physicians:    Chief Complaint:  Sjogren's syndrome (Takes Plaquenil 200mg QD)      HPI/Interval History:   The patient is doing great. He has no complaints of dry eyes or dry mouth. He also denies any systemic issues. His energy level is good and he is busy every day.          ROS:   GEN:    no fever, night sweats or weight loss  SKIN:   no rashes , erythema, bruising, or swelling, no Raynauds, no photosensitivity  HEENT: no changes in vision, no mouth ulcers, no sicca symptoms, no scalp tenderness, no jaw claudication.  CV:      no CP, PND, CHAPARRO or orthopnea, no palpitations  PULM: no SOB, cough, hemoptysis, sputum or pleuritic pain  GI:       no abdominal pain, nausea, vomiting, constipation, diarrhea, melanotic stools, BRBPR, or hematemesis, no dysphagia, no GERD  :     no hematuria, dysuria  NEURO: no paresthesias, headaches, acute visual disturbances  MUSCULOSKELETAL:  No muscle or joint complaints  PSYCH:   No insomnia, no significant anxiety or depression    Medications:    Current Outpatient Medications:     aspirin (ECOTRIN) 81 MG EC tablet, Take 81 mg by mouth once daily., Disp: , Rfl:     cevimeline (EVOXAC) 30 mg capsule, Take 30 mg by mouth 2 (two) times daily.  , Disp: , Rfl:     cycloSPORINE (RESTASIS) 0.05 % ophthalmic emulsion, Place 1 drop into both eyes 2 (two) times daily.  , Disp: , Rfl:     doxepin (SINEQUAN) 10 MG capsule, Take 10 mg by mouth every evening., Disp: , Rfl:     hydroxychloroquine (PLAQUENIL) 200 mg tablet, TAKE 1 TABLET DAILY, Disp: 90 tablet, Rfl: 3    hyoscyamine (LEVSIN/SL) 0.125 mg Subl, Place 0.125 mg under the tongue every 4 (four) hours as needed., Disp: , Rfl:     latanoprost (XALATAN) 0.005 %  ophthalmic solution, Place 1 drop into both eyes every evening.  , Disp: , Rfl:     metoprolol succinate (TOPROL-XL) 25 MG 24 hr tablet, Take 1 tablet (25 mg total) by mouth once daily., Disp: 90 tablet, Rfl: 1    MULTIVITAMIN/IRON/FOLIC ACID (CENTRUM COMPLETE ORAL), Take 1 tablet by mouth once daily., Disp: , Rfl:     omega-3 fatty acids (FISH OIL) 500 mg Cap, Take by mouth., Disp: , Rfl:     omeprazole (PRILOSEC) 20 MG capsule, Take 20 mg by mouth once daily.  , Disp: , Rfl:     vitamin E 400 UNIT capsule, Take 400 Units by mouth once daily., Disp: , Rfl:     VOLTAREN 1 % Gel, , Disp: , Rfl: 3    zinc gluconate 50 mg tablet, Take 50 mg by mouth once daily., Disp: , Rfl:       FAMILY HISTORY: negative for Connective Tissue Disease        Review of patient's allergies indicates:   Allergen Reactions    Sulfa (sulfonamide antibiotics) Other (See Comments)     Ringing in ears             Objective:     Vitals:  Blood pressure 127/74, pulse 75, weight 103.8 kg (228 lb 14.4 oz).    Physical Examination:   GEN: wn/wd male in no apparent distress  SKIN: no rashes, no lesions, no sclerodactyly, no Raynaud's, no periungual erythema  HEAD: no alopecia, no scalp tenderness, no temporal artery tenderness or induration.  EYES: no pallor, no icterus, PERRLA  ENT:  no thrush, no mucosal dryness or ulcerations, adequate oral hygiene & dentition.  NECK: supple x 6, no masses, no thyromegaly, no lymphadenopathy.  CV:   S1 and S2 regular, no murmurs, gallop or rubs  CHEST: Normal respiratory effort;  normal breath sounds/no adventitious sounds. No signs of consolidation.  ABD: non-tender and non-distended; soft; normal bowel sounds; no rebound/guarding or tenderness. No hepatosplenomegaly.  Musculoskeletal:  No evidence of inflammatory arthritis or of any progressive degenerative changes  EXTREM: no clubbing, cyanosis or edema. normal pulses.  NEURO: grossly intact; motor/sensory WNL; no tremors  PSYCH:  normal mood, affect  and behavior            Labs:   Lab Results   Component Value Date    WBC 6.4 06/25/2018    HGB 15.4 06/25/2018    HCT 44.3 06/25/2018    .4 (H) 06/25/2018     06/25/2018   CMP@  Sodium   Date Value Ref Range Status   09/10/2018 139 136 - 145 mmol/L Final   06/25/2018 139 134 - 144 mmol/L      Potassium   Date Value Ref Range Status   09/10/2018 4.3 3.5 - 5.1 mmol/L Final   08/31/2015 4.3 3.5 - 5.1 MMOL/L Final     Chloride   Date Value Ref Range Status   09/10/2018 104 95 - 110 mmol/L Final   06/25/2018 104 98 - 110 mmol/L      CO2   Date Value Ref Range Status   09/10/2018 28 22 - 31 mmol/L Final     Glucose   Date Value Ref Range Status   09/10/2018 89 70 - 110 mg/dL Final     Comment:     The ADA recommends the following guidelines for fasting glucose:  Normal:       less than 100 mg/dL  Prediabetes:  100 mg/dL to 125 mg/dL  Diabetes:     126 mg/dL or higher     06/25/2018 79 70 - 99 mg/dL      BUN, Bld   Date Value Ref Range Status   09/10/2018 15 9 - 21 mg/dL Final     Creatinine   Date Value Ref Range Status   09/10/2018 0.85 0.50 - 1.40 mg/dL Final   06/25/2018 0.73 0.60 - 1.40 mg/dL      Calcium   Date Value Ref Range Status   09/10/2018 8.8 8.4 - 10.2 mg/dL Final     Total Protein   Date Value Ref Range Status   09/10/2018 6.3 6.0 - 8.4 g/dL Final     Albumin   Date Value Ref Range Status   09/10/2018 3.5 3.5 - 5.2 g/dL Final   06/25/2018 4.0 3.1 - 4.7 g/dL      Total Bilirubin   Date Value Ref Range Status   09/10/2018 0.9 0.2 - 1.3 mg/dL Final     Alkaline Phosphatase   Date Value Ref Range Status   09/10/2018 74 38 - 145 U/L Final     AST   Date Value Ref Range Status   09/10/2018 29 17 - 59 U/L Final     ALT   Date Value Ref Range Status   09/10/2018 20 10 - 44 U/L Final     CRP   Date Value Ref Range Status   06/25/2018 0.10 0.00 - 1.40 mg/dL          Radiology/Diagnostic Studies:    none    Assessment/Discussion/Plan:   72 y.o. male with Sjogren's syndrome-good control with  hydroxychloroquine as a background medication and Restasis plus Evoxac for symptom control.      PLAN:  I will continue his medication without change. I did discuss with him the limited role hydroxychloroquine plays and its empiric use for Sjogren's.  Routine blood testing was obtained today.  He has had his influenza vaccine. I have suggested that he check with his primary provider about the new zoster vaccine and to inquire about the timing for is pneumonia vaccine.      RTC:  I will see him back in 4-6 months.      Electronically signed by Nic Piña MD

## 2018-11-14 RX ORDER — CEVIMELINE HYDROCHLORIDE 30 MG/1
CAPSULE ORAL
Qty: 270 CAPSULE | Refills: 3 | Status: SHIPPED | OUTPATIENT
Start: 2018-11-14 | End: 2019-10-29 | Stop reason: SDUPTHER

## 2019-02-01 RX ORDER — HYDROXYCHLOROQUINE SULFATE 200 MG/1
TABLET, FILM COATED ORAL
Qty: 90 TABLET | Refills: 3 | Status: SHIPPED | OUTPATIENT
Start: 2019-02-01 | End: 2020-01-27 | Stop reason: SDUPTHER

## 2019-03-11 ENCOUNTER — OFFICE VISIT (OUTPATIENT)
Dept: RHEUMATOLOGY | Facility: CLINIC | Age: 74
End: 2019-03-11
Payer: MEDICARE

## 2019-03-11 VITALS
DIASTOLIC BLOOD PRESSURE: 83 MMHG | SYSTOLIC BLOOD PRESSURE: 132 MMHG | BODY MASS INDEX: 30.25 KG/M2 | WEIGHT: 229.31 LBS

## 2019-03-11 DIAGNOSIS — M35.01 SJOGREN'S SYNDROME WITH KERATOCONJUNCTIVITIS SICCA: Primary | ICD-10-CM

## 2019-03-11 LAB
ALBUMIN SERPL-MCNC: 3.9 G/DL (ref 3.1–4.7)
ALP SERPL-CCNC: 68 IU/L (ref 40–104)
ALT (SGPT): 17 IU/L (ref 3–33)
AST SERPL-CCNC: 25 IU/L (ref 10–40)
BASOPHILS NFR BLD: 0.1 K/UL (ref 0–0.2)
BASOPHILS NFR BLD: 1 %
BILIRUB SERPL-MCNC: 0.8 MG/DL (ref 0.3–1)
BUN SERPL-MCNC: 17 MG/DL (ref 8–20)
CALCIUM SERPL-MCNC: 8.6 MG/DL (ref 7.7–10.4)
CHLORIDE: 104 MMOL/L (ref 98–110)
CO2 SERPL-SCNC: 29.3 MMOL/L (ref 22.8–31.6)
CREATININE: 0.88 MG/DL (ref 0.6–1.4)
CRP SERPL-MCNC: 0.05 MG/DL (ref 0–1.4)
EOSINOPHIL NFR BLD: 0.2 K/UL (ref 0–0.7)
EOSINOPHIL NFR BLD: 3.8 %
ERYTHROCYTE [DISTWIDTH] IN BLOOD BY AUTOMATED COUNT: 12.7 % (ref 11.7–14.9)
GLUCOSE: 94 MG/DL (ref 70–99)
GRAN #: 2.8 K/UL (ref 1.4–6.5)
GRAN%: 54 %
HCT VFR BLD AUTO: 43.2 % (ref 39–55)
HGB BLD-MCNC: 14.8 G/DL (ref 14–16)
IMMATURE GRANS (ABS): 0 K/UL (ref 0–1)
IMMATURE GRANULOCYTES: 0.6 %
LYMPH #: 1.6 K/UL (ref 1.2–3.4)
LYMPH%: 29.8 %
MCH RBC QN AUTO: 35 PG (ref 25–35)
MCHC RBC AUTO-ENTMCNC: 34.3 G/DL (ref 31–36)
MCV RBC AUTO: 102.1 FL (ref 80–100)
MONO #: 0.6 K/UL (ref 0.1–0.6)
MONO%: 10.8 %
NUCLEATED RBCS: 0 %
PLATELET # BLD AUTO: 145 K/UL (ref 140–440)
PMV BLD AUTO: 13 FL (ref 8.8–12.7)
POTASSIUM SERPL-SCNC: 4.3 MMOL/L (ref 3.5–5)
PROT SERPL-MCNC: 6.7 G/DL (ref 6–8.2)
RBC # BLD AUTO: 4.23 M/UL (ref 4.3–5.9)
SODIUM: 139 MMOL/L (ref 134–144)
WBC # BLD AUTO: 5.2 K/UL (ref 5–10)

## 2019-03-11 PROCEDURE — 3079F DIAST BP 80-89 MM HG: CPT | Mod: ,,, | Performed by: INTERNAL MEDICINE

## 2019-03-11 PROCEDURE — 1101F PR PT FALLS ASSESS DOC 0-1 FALLS W/OUT INJ PAST YR: ICD-10-PCS | Mod: ,,, | Performed by: INTERNAL MEDICINE

## 2019-03-11 PROCEDURE — 1101F PT FALLS ASSESS-DOCD LE1/YR: CPT | Mod: ,,, | Performed by: INTERNAL MEDICINE

## 2019-03-11 PROCEDURE — 3075F SYST BP GE 130 - 139MM HG: CPT | Mod: ,,, | Performed by: INTERNAL MEDICINE

## 2019-03-11 PROCEDURE — 99213 PR OFFICE/OUTPT VISIT, EST, LEVL III, 20-29 MIN: ICD-10-PCS | Mod: ,,, | Performed by: INTERNAL MEDICINE

## 2019-03-11 PROCEDURE — 99213 OFFICE O/P EST LOW 20 MIN: CPT | Mod: ,,, | Performed by: INTERNAL MEDICINE

## 2019-03-11 PROCEDURE — 3079F PR MOST RECENT DIASTOLIC BLOOD PRESSURE 80-89 MM HG: ICD-10-PCS | Mod: ,,, | Performed by: INTERNAL MEDICINE

## 2019-03-11 PROCEDURE — 3075F PR MOST RECENT SYSTOLIC BLOOD PRESS GE 130-139MM HG: ICD-10-PCS | Mod: ,,, | Performed by: INTERNAL MEDICINE

## 2019-03-11 RX ORDER — AMOXICILLIN 500 MG/1
TABLET, FILM COATED ORAL
Refills: 1 | COMMUNITY
Start: 2019-03-03 | End: 2019-03-27

## 2019-03-11 NOTE — PROGRESS NOTES
[facility] RHEUMATOLOGY           Follow-up visit    Notes dictated via Dragon to EPIC. Please forgive any unintended errors.  Subjective:       Patient ID:   NAME: Nigel Marcus Jr. : 1945     73 y.o. male    Referring Doc: No ref. provider found  Other Physicians:    Chief Complaint:  Sjogren's syndrome      HPI/Interval History:   The patient is doing well. He has no complaints significant dry mouth or eyes.           ROS:   GEN:    no fever, night sweats or weight loss  SKIN:   no rashes , erythema, bruising, or swelling, no Raynauds, no photosensitivity  HEENT: no changes in vision, no mouth ulcers, no sicca symptoms, no scalp tenderness, no jaw claudication.  CV:      no CP, PND, CHAPARRO or orthopnea, no palpitations  PULM: no SOB, cough, hemoptysis, sputum or pleuritic pain  GI:       no GERD, no dysphagia, no abdominal pain, nausea, vomiting, constipation, diarrhea, melanotic stools, BRBPR, or hematemesis  :      no hematuria, dysuria  NEURO: no paresthesias, headaches, acute visual disturbances  MUSCULOSKELETAL:  No muscle or joint complaints  PSYCH:   No insomnia, no significant anxiety or depression    Medications:    Current Outpatient Medications:     amoxicillin (AMOXIL) 500 MG Tab, , Disp: , Rfl: 1    aspirin (ECOTRIN) 81 MG EC tablet, Take 81 mg by mouth once daily., Disp: , Rfl:     cevimeline (EVOXAC) 30 mg capsule, TAKE 1 CAPSULE 2 TO 3 TIMESDAILY AS NEEDED FOR DRYNESS, Disp: 270 capsule, Rfl: 3    cycloSPORINE (RESTASIS) 0.05 % ophthalmic emulsion, Place 1 drop into both eyes 2 (two) times daily.  , Disp: , Rfl:     doxepin (SINEQUAN) 10 MG capsule, Take 10 mg by mouth every evening., Disp: , Rfl:     hydroxychloroquine (PLAQUENIL) 200 mg tablet, TAKE 1 TABLET DAILY, Disp: 90 tablet, Rfl: 3    hyoscyamine (LEVSIN/SL) 0.125 mg Subl, Place 0.125 mg under the tongue every 4 (four) hours as needed., Disp: , Rfl:     latanoprost (XALATAN) 0.005 % ophthalmic solution, Place 1 drop into  both eyes every evening.  , Disp: , Rfl:     metoprolol succinate (TOPROL-XL) 25 MG 24 hr tablet, TAKE 1 TABLET ONCE DAILY, Disp: 90 tablet, Rfl: 1    MULTIVITAMIN/IRON/FOLIC ACID (CENTRUM COMPLETE ORAL), Take 1 tablet by mouth once daily., Disp: , Rfl:     omega-3 fatty acids (FISH OIL) 500 mg Cap, Take by mouth., Disp: , Rfl:     omeprazole (PRILOSEC) 20 MG capsule, Take 20 mg by mouth once daily.  , Disp: , Rfl:     vitamin E 400 UNIT capsule, Take 400 Units by mouth once daily., Disp: , Rfl:     VOLTAREN 1 % Gel, , Disp: , Rfl: 3    zinc gluconate 50 mg tablet, Take 50 mg by mouth once daily., Disp: , Rfl:       FAMILY HISTORY: negative for Connective Tissue Disease        Review of patient's allergies indicates:   Allergen Reactions    Sulfa (sulfonamide antibiotics) Other (See Comments)     Ringing in ears             Objective:     Vitals:  Blood pressure 132/83, weight 104 kg (229 lb 4.8 oz).    Physical Examination:   GEN: wn/wd male in no apparent distress  SKIN: no rashes, no sclerodactyly, no Raynaud's, no periungual erythema, no digital tip ulcerations, no nailbed pitting  HEAD: no alopecia, no scalp tenderness, no temporal artery tenderness or induration.  EYES: no pallor, no icterus, PERRLA  ENT:  no thrush, no mucosal dryness or ulcerations, adequate oral hygiene & dentition.  NECK: supple x 6, no masses, no thyromegaly, no lymphadenopathy.  CV:   S1 and S2 regular, no murmurs, gallop or rubs  CHEST: Normal respiratory effort;  normal breath sounds/no adventitious sounds. No signs of consolidation.  ABD: non-tender and non-distended; soft; normal bowel sounds; no rebound/guarding or tenderness. No hepatosplenomegaly.  Musculoskeletal:  No evidence of inflammatory arthritis or of degenerative progression  EXTREM: no clubbing, cyanosis or edema. normal pulses.  NEURO:  grossly intact; motor/sensory WNL; no tremors  PSYCH:  normal mood, affect and behavior            Labs:   Lab Results    Component Value Date    WBC 5.0 10/29/2018    HGB 15.1 10/29/2018    HCT 44.6 10/29/2018    .4 (H) 10/29/2018     10/29/2018   CMP@  Sodium   Date Value Ref Range Status   10/29/2018 140 134 - 144 mmol/L      Potassium   Date Value Ref Range Status   10/29/2018 4.6 3.5 - 5.0 mmol/L    08/31/2015 4.3 3.5 - 5.1 MMOL/L Final     Chloride   Date Value Ref Range Status   10/29/2018 104 98 - 110 mmol/L      CO2   Date Value Ref Range Status   10/29/2018 27.6 22.8 - 31.6 mmol/L      Glucose   Date Value Ref Range Status   10/29/2018 84 70 - 99 mg/dL      BUN, Bld   Date Value Ref Range Status   10/29/2018 16 8 - 20 mg/dL      Creatinine   Date Value Ref Range Status   10/29/2018 0.92 0.60 - 1.40 mg/dL      Calcium   Date Value Ref Range Status   10/29/2018 8.8 7.7 - 10.4 mg/dL      Total Protein   Date Value Ref Range Status   10/29/2018 6.4 6.0 - 8.2 g/dL      Albumin   Date Value Ref Range Status   10/29/2018 3.8 3.1 - 4.7 g/dL      Total Bilirubin   Date Value Ref Range Status   10/29/2018 1.0 0.3 - 1.0 mg/dL      Alkaline Phosphatase   Date Value Ref Range Status   10/29/2018 66 40 - 104 IU/L      AST   Date Value Ref Range Status   10/29/2018 22 10 - 40 IU/L      ALT   Date Value Ref Range Status   09/10/2018 20 10 - 44 U/L Final     CRP   Date Value Ref Range Status   10/29/2018 0.07 0.00 - 1.40 mg/dL          Radiology/Diagnostic Studies:    none    Assessment/Discussion/Plan:   73 y.o. male with Sjögren syndrome-very well controlled on hydroxychloroquine 200 mg once daily and Evoxac 30 mg 3 times daily.      PLAN:  I will continue his medication unchanged. Routine blood testing was obtained.      RTC:  I will see him back in 4-6 months.      Electronically signed by Nic Piña MD

## 2019-07-22 ENCOUNTER — TELEPHONE (OUTPATIENT)
Dept: NEUROLOGY | Facility: CLINIC | Age: 74
End: 2019-07-22

## 2019-07-22 ENCOUNTER — TELEPHONE (OUTPATIENT)
Dept: RHEUMATOLOGY | Facility: CLINIC | Age: 74
End: 2019-07-22

## 2019-07-22 ENCOUNTER — OFFICE VISIT (OUTPATIENT)
Dept: RHEUMATOLOGY | Facility: CLINIC | Age: 74
End: 2019-07-22
Payer: MEDICARE

## 2019-07-22 VITALS
SYSTOLIC BLOOD PRESSURE: 137 MMHG | DIASTOLIC BLOOD PRESSURE: 76 MMHG | WEIGHT: 229.19 LBS | BODY MASS INDEX: 30.24 KG/M2

## 2019-07-22 DIAGNOSIS — G44.89 CHRONIC MIXED HEADACHE SYNDROME: ICD-10-CM

## 2019-07-22 DIAGNOSIS — M35.00 SJOGREN'S SYNDROME, WITH UNSPECIFIED ORGAN INVOLVEMENT: ICD-10-CM

## 2019-07-22 DIAGNOSIS — G44.89 CHRONIC MIXED HEADACHE SYNDROME: Primary | ICD-10-CM

## 2019-07-22 DIAGNOSIS — Z79.899 ENCOUNTER FOR LONG-TERM (CURRENT) DRUG USE: Primary | ICD-10-CM

## 2019-07-22 LAB — CREATININE: 0.91 MG/DL (ref 0.6–1.4)

## 2019-07-22 PROCEDURE — 3078F PR MOST RECENT DIASTOLIC BLOOD PRESSURE < 80 MM HG: ICD-10-PCS | Mod: ,,, | Performed by: INTERNAL MEDICINE

## 2019-07-22 PROCEDURE — 99214 PR OFFICE/OUTPT VISIT, EST, LEVL IV, 30-39 MIN: ICD-10-PCS | Mod: ,,, | Performed by: INTERNAL MEDICINE

## 2019-07-22 PROCEDURE — 1101F PT FALLS ASSESS-DOCD LE1/YR: CPT | Mod: ,,, | Performed by: INTERNAL MEDICINE

## 2019-07-22 PROCEDURE — 1101F PR PT FALLS ASSESS DOC 0-1 FALLS W/OUT INJ PAST YR: ICD-10-PCS | Mod: ,,, | Performed by: INTERNAL MEDICINE

## 2019-07-22 PROCEDURE — 3075F PR MOST RECENT SYSTOLIC BLOOD PRESS GE 130-139MM HG: ICD-10-PCS | Mod: ,,, | Performed by: INTERNAL MEDICINE

## 2019-07-22 PROCEDURE — 3075F SYST BP GE 130 - 139MM HG: CPT | Mod: ,,, | Performed by: INTERNAL MEDICINE

## 2019-07-22 PROCEDURE — 3078F DIAST BP <80 MM HG: CPT | Mod: ,,, | Performed by: INTERNAL MEDICINE

## 2019-07-22 PROCEDURE — 99214 OFFICE O/P EST MOD 30 MIN: CPT | Mod: ,,, | Performed by: INTERNAL MEDICINE

## 2019-07-22 NOTE — PROGRESS NOTES
[facility] RHEUMATOLOGY           Follow-up visit    Notes dictated via Dragon to EPIC. Please forgive any unintended errors.  Subjective:       Patient ID:   NAME: Nigel Marcus Jr. : 1945     73 y.o. male    Referring Doc: No ref. provider found  Other Physicians:    Chief Complaint:  Sjogren's syndrome      HPI/Interval History:   The patient is doing well. His had no problems with dry eyes or dry mouth.  He has been having unusual problems with headaches, flashes of light, and even 2 brief episodes of right-sided visual loss. He was seen by ophthalmology without any definitive diagnosis as yet. He had blood testing done to rule out giant cell arteritis. The acute phase reactants are normal.  Also had a cardiovascular evaluation.    ROS:   GEN:    no fever, night sweats or weight loss  SKIN:   no rashes, erythema, bruising, or swelling, no Raynauds, no photosensitivity  HEENT:no mouth ulcers, no sicca symptoms, no scalp tenderness, no jaw claudication.  CV:      no CP, PND, CHAPARRO, orthopnea, no palpitations  PULM: no SOB, cough, hemoptysis, sputum or pleuritic pain  GI:       no GERD, no dysphagia, no hematemesis, no abdominal pain, nausea, vomiting, constipation, diarrhea, melanotic stools, BRBPR  :      no hematuria, dysuria  NEURO: no paresthesias, + headaches, ++ acute visual disturbances  MUSCULOSKELETAL:  No muscle or joint pain  PSYCH:   No insomnia, no increased anxiety, no increased depression    Past Medical/Surgical History:  Past Medical History:   Diagnosis Date    Dyslipidemia 2014    Gougerout-Sjoegren syndrome 4/3/2006    Inguinal hernia 2/10/2015     Past Surgical History:   Procedure Laterality Date    ACHILLES TENDON SURGERY      HEMORRHOID SURGERY         Allergies:  Review of patient's allergies indicates:   Allergen Reactions    Sulfa (sulfonamide antibiotics) Other (See Comments)     Ringing in ears       Social/Family History:  Social History     Socioeconomic History     Marital status:      Spouse name: Not on file    Number of children: Not on file    Years of education: Not on file    Highest education level: Not on file   Occupational History    Not on file   Social Needs    Financial resource strain: Not on file    Food insecurity:     Worry: Not on file     Inability: Not on file    Transportation needs:     Medical: Not on file     Non-medical: Not on file   Tobacco Use    Smoking status: Never Smoker    Smokeless tobacco: Never Used   Substance and Sexual Activity    Alcohol use: Yes     Alcohol/week: 4.2 oz     Types: 7 Shots of liquor per week    Drug use: No    Sexual activity: Not on file   Lifestyle    Physical activity:     Days per week: Not on file     Minutes per session: Not on file    Stress: Not on file   Relationships    Social connections:     Talks on phone: Not on file     Gets together: Not on file     Attends Jewish service: Not on file     Active member of club or organization: Not on file     Attends meetings of clubs or organizations: Not on file     Relationship status: Not on file   Other Topics Concern    Not on file   Social History Narrative    Not on file     Family History   Problem Relation Age of Onset    Cancer Father         prostate    Rheum arthritis Mother      FAMILY HISTORY: negative for Connective Tissue Disease      Medications:    Current Outpatient Medications:     aspirin (ECOTRIN) 81 MG EC tablet, Take 81 mg by mouth once daily., Disp: , Rfl:     cevimeline (EVOXAC) 30 mg capsule, TAKE 1 CAPSULE 2 TO 3 TIMESDAILY AS NEEDED FOR DRYNESS, Disp: 270 capsule, Rfl: 3    cycloSPORINE (RESTASIS) 0.05 % ophthalmic emulsion, Place 1 drop into both eyes 2 (two) times daily.  , Disp: , Rfl:     doxepin (SINEQUAN) 10 MG capsule, Take 10 mg by mouth every evening., Disp: , Rfl:     hydroxychloroquine (PLAQUENIL) 200 mg tablet, TAKE 1 TABLET DAILY, Disp: 90 tablet, Rfl: 3    hyoscyamine (LEVSIN/SL) 0.125 mg  Subl, Place 0.125 mg under the tongue every 4 (four) hours as needed., Disp: , Rfl:     latanoprost (XALATAN) 0.005 % ophthalmic solution, Place 1 drop into both eyes every evening.  , Disp: , Rfl:     metoprolol succinate (TOPROL-XL) 25 MG 24 hr tablet, TAKE 1 TABLET ONCE DAILY, Disp: 90 tablet, Rfl: 1    MULTIVITAMIN/IRON/FOLIC ACID (CENTRUM COMPLETE ORAL), Take 1 tablet by mouth once daily., Disp: , Rfl:     omega-3 fatty acids (FISH OIL) 500 mg Cap, Take by mouth., Disp: , Rfl:     omeprazole (PRILOSEC) 20 MG capsule, Take 20 mg by mouth once daily.  , Disp: , Rfl:     vitamin E 400 UNIT capsule, Take 400 Units by mouth once daily., Disp: , Rfl:     VOLTAREN 1 % Gel, , Disp: , Rfl: 3    zinc gluconate 50 mg tablet, Take 50 mg by mouth once daily., Disp: , Rfl:       Objective:     Vitals:  Blood pressure 137/76, weight 104 kg (229 lb 3.2 oz).    Physical Examination:   GEN: wn/wd male in no apparent distress  SKIN: no rashes, no sclerodactyly, no Raynaud's, no periungual erythema, no digital tip ulcerations, no nailbed pitting  HEAD: no alopecia, no scalp tenderness, no temporal artery tenderness or induration.  EYES: no pallor, no icterus, PERRLA  ENT:  no thrush, no mucosal dryness or ulcerations, adequate oral hygiene & dentition.  NECK: supple x 6, no masses, no thyromegaly, no lymphadenopathy.  CV:   S1 and S2 regular, no murmurs, gallop or rubs  CHEST: Normal respiratory effort;  normal breath sounds/no adventitious sounds. No signs of consolidation.  ABD: non-tender and non-distended; soft; normal bowel sounds; no rebound/guarding or tenderness. No hepatosplenomegaly.  Musculoskeletal:  no evidence of muscle or joint diseases  EXTREM: no clubbing, cyanosis or edema. normal pulses.  NEURO:  grossly intact; motor/sensory WNL; no tremors  PSYCH:  normal mood, affect and behavior    Labs:   Lab Results   Component Value Date    WBC 5.37 07/19/2019    HGB 14.6 07/19/2019    HCT 42.7 07/19/2019    MCV  101 (H) 07/19/2019     (L) 07/19/2019   CMP@  Sodium   Date Value Ref Range Status   03/11/2019 139 134 - 144 mmol/L      Potassium   Date Value Ref Range Status   03/11/2019 4.3 3.5 - 5.0 mmol/L    08/31/2015 4.3 3.5 - 5.1 MMOL/L Final     Chloride   Date Value Ref Range Status   03/11/2019 104 98 - 110 mmol/L      CO2   Date Value Ref Range Status   03/11/2019 29.3 22.8 - 31.6 mmol/L      Glucose   Date Value Ref Range Status   03/11/2019 94 70 - 99 mg/dL      BUN, Bld   Date Value Ref Range Status   03/11/2019 17 8 - 20 mg/dL      Creatinine   Date Value Ref Range Status   07/22/2019 0.91 0.60 - 1.40 mg/dL      Calcium   Date Value Ref Range Status   03/11/2019 8.6 7.7 - 10.4 mg/dL      Total Protein   Date Value Ref Range Status   03/11/2019 6.7 6.0 - 8.2 g/dL      Albumin   Date Value Ref Range Status   03/11/2019 3.9 3.1 - 4.7 g/dL      Total Bilirubin   Date Value Ref Range Status   03/11/2019 0.8 0.3 - 1.0 mg/dL      Alkaline Phosphatase   Date Value Ref Range Status   03/11/2019 68 40 - 104 IU/L      AST   Date Value Ref Range Status   03/11/2019 25 10 - 40 IU/L      ALT   Date Value Ref Range Status   09/10/2018 20 10 - 44 U/L Final     CRP   Date Value Ref Range Status   07/19/2019 <0.50 0.00 - 0.90 mg/dL Final       Radiology/Diagnostic Studies:    None    Assessment/Discussion/Plan:   73 y.o. male with Sjogren's syndrome- good long-term control on hydroxychloroquine 200 mg daily and Evoxac 30 mg twice daily as needed  2) new onset of neurologic complaints including amaurosis fugax OD    PLAN: I would like him to be seen by neurology as soon as possible. It seems as though ophthalmology and cardiology have no explanation for his mostly neurologic symptoms. I have taken the liberty of ordering an MRI scan of the brain. A serum creatinine level was ordered for the purpose of his brain scan.  I will see that the film is sent to the neurologist.    RTC:  I will see him back in 3 months or sooner if  needed    Electronically signed by Nic Piña MD

## 2019-07-22 NOTE — TELEPHONE ENCOUNTER
----- Message from Rekha Zhu sent at 7/22/2019 11:19 AM CDT -----  Contact: pt  Calling in regards to an appointment for headaches and vision and please advise . [phone] (home)

## 2019-07-22 NOTE — TELEPHONE ENCOUNTER
----- Message from Rekha Zhu sent at 7/22/2019 11:19 AM CDT -----  Contact: pt  Calling in regards to an appointment for headaches and vision and please advise . [phone] (home)

## 2019-07-25 ENCOUNTER — TELEPHONE (OUTPATIENT)
Dept: RHEUMATOLOGY | Facility: CLINIC | Age: 74
End: 2019-07-25

## 2019-07-25 NOTE — TELEPHONE ENCOUNTER
----- Message from Nic Piña MD sent at 7/25/2019  9:09 AM CDT -----  Please notify him that his MRI of the brain was normal.  Thanks

## 2019-08-09 ENCOUNTER — OFFICE VISIT (OUTPATIENT)
Dept: URGENT CARE | Facility: CLINIC | Age: 74
End: 2019-08-09
Payer: MEDICARE

## 2019-08-09 VITALS
OXYGEN SATURATION: 96 % | TEMPERATURE: 98 F | SYSTOLIC BLOOD PRESSURE: 142 MMHG | DIASTOLIC BLOOD PRESSURE: 85 MMHG | HEART RATE: 77 BPM | WEIGHT: 229 LBS | BODY MASS INDEX: 30.35 KG/M2 | RESPIRATION RATE: 16 BRPM | HEIGHT: 73 IN

## 2019-08-09 DIAGNOSIS — Z51.89 VISIT FOR WOUND CHECK: Primary | ICD-10-CM

## 2019-08-09 PROCEDURE — 99213 PR OFFICE/OUTPT VISIT, EST, LEVL III, 20-29 MIN: ICD-10-PCS | Mod: S$GLB,,, | Performed by: PHYSICIAN ASSISTANT

## 2019-08-09 PROCEDURE — 3079F PR MOST RECENT DIASTOLIC BLOOD PRESSURE 80-89 MM HG: ICD-10-PCS | Mod: CPTII,S$GLB,, | Performed by: PHYSICIAN ASSISTANT

## 2019-08-09 PROCEDURE — 99213 OFFICE O/P EST LOW 20 MIN: CPT | Mod: S$GLB,,, | Performed by: PHYSICIAN ASSISTANT

## 2019-08-09 PROCEDURE — 1101F PR PT FALLS ASSESS DOC 0-1 FALLS W/OUT INJ PAST YR: ICD-10-PCS | Mod: CPTII,S$GLB,, | Performed by: PHYSICIAN ASSISTANT

## 2019-08-09 PROCEDURE — 3079F DIAST BP 80-89 MM HG: CPT | Mod: CPTII,S$GLB,, | Performed by: PHYSICIAN ASSISTANT

## 2019-08-09 PROCEDURE — 3077F PR MOST RECENT SYSTOLIC BLOOD PRESSURE >= 140 MM HG: ICD-10-PCS | Mod: CPTII,S$GLB,, | Performed by: PHYSICIAN ASSISTANT

## 2019-08-09 PROCEDURE — 3077F SYST BP >= 140 MM HG: CPT | Mod: CPTII,S$GLB,, | Performed by: PHYSICIAN ASSISTANT

## 2019-08-09 PROCEDURE — 1101F PT FALLS ASSESS-DOCD LE1/YR: CPT | Mod: CPTII,S$GLB,, | Performed by: PHYSICIAN ASSISTANT

## 2019-08-09 NOTE — PROGRESS NOTES
"Subjective:       Patient ID: Nigel Marcus Jr. is a 73 y.o. male.    Vitals:  height is 6' 1" (1.854 m) and weight is 103.9 kg (229 lb). His temperature is 97.6 °F (36.4 °C). His blood pressure is 142/85 (abnormal) and his pulse is 77. His respiration is 16 and oxygen saturation is 96%.     Chief Complaint: Hand Pain    Pt thinks he cut his left index finger x 3 days ago, and feels fluid may be inside of wound .    Hand Pain    The incident occurred 3 to 5 days ago. The incident occurred at home. There was no injury mechanism. The pain does not radiate. The pain is at a severity of 1/10. The pain is mild. Pertinent negatives include no chest pain. He has tried nothing for the symptoms.       Constitution: Negative for chills, fatigue and fever.   HENT: Negative for congestion and sore throat.    Neck: Negative for painful lymph nodes.   Cardiovascular: Negative for chest pain and leg swelling.   Eyes: Negative for double vision and blurred vision.   Respiratory: Negative for cough and shortness of breath.    Gastrointestinal: Negative for nausea, vomiting and diarrhea.   Genitourinary: Negative for dysuria, frequency and urgency.   Musculoskeletal: Negative for joint pain, joint swelling, muscle cramps and muscle ache.   Skin: Positive for wound. Negative for color change, pale and rash.   Allergic/Immunologic: Negative for seasonal allergies.   Neurological: Negative for dizziness, history of vertigo, light-headedness, passing out and headaches.   Hematologic/Lymphatic: Negative for swollen lymph nodes, easy bruising/bleeding and history of blood clots. Does not bruise/bleed easily.   Psychiatric/Behavioral: Negative for nervous/anxious, sleep disturbance and depression. The patient is not nervous/anxious.        Objective:      Physical Exam   Constitutional: He is oriented to person, place, and time. He appears well-developed and well-nourished. No distress.   HENT:   Head: Normocephalic and atraumatic.   Right " Ear: External ear normal.   Left Ear: External ear normal.   Nose: Nose normal.   Eyes: Conjunctivae, EOM and lids are normal.   Neck: Trachea normal, full passive range of motion without pain and phonation normal. Neck supple.   Cardiovascular: Normal rate.   Pulmonary/Chest: Effort normal. No stridor.   Musculoskeletal: Normal range of motion.   Neurological: He is alert and oriented to person, place, and time.   Skin: Skin is warm, dry and intact. He is not diaphoretic.   Small laceration less than 2 mm to left ventral index finger without scab.  Well-healed with no surrounding erythema   Psychiatric: He has a normal mood and affect. His speech is normal and behavior is normal. Judgment and thought content normal. Cognition and memory are normal.   Nursing note and vitals reviewed.      Assessment:       1. Visit for wound check        Plan:         Visit for wound check     Patient unsure if there is foreign body beneath the surface or if there is fluid that needed to be drained.  He cannot recall how he cut the finger.  Discussed that the cut appears to be well healing.  I would not probe the wound.  I can palpate no foreign body underneath the surface.  Reassured patient.  Return to clinic with any needs.

## 2019-08-28 ENCOUNTER — OFFICE VISIT (OUTPATIENT)
Dept: RHEUMATOLOGY | Facility: CLINIC | Age: 74
End: 2019-08-28
Payer: MEDICARE

## 2019-08-28 VITALS — WEIGHT: 229 LBS | BODY MASS INDEX: 30.21 KG/M2 | DIASTOLIC BLOOD PRESSURE: 84 MMHG | SYSTOLIC BLOOD PRESSURE: 146 MMHG

## 2019-08-28 DIAGNOSIS — M35.00 SJOGREN'S SYNDROME WITHOUT EXTRAGLANDULAR INVOLVEMENT: Primary | ICD-10-CM

## 2019-08-28 DIAGNOSIS — Z79.899 ENCOUNTER FOR LONG-TERM (CURRENT) DRUG USE: ICD-10-CM

## 2019-08-28 DIAGNOSIS — R51.9 NONINTRACTABLE EPISODIC HEADACHE, UNSPECIFIED HEADACHE TYPE: ICD-10-CM

## 2019-08-28 PROCEDURE — 1101F PR PT FALLS ASSESS DOC 0-1 FALLS W/OUT INJ PAST YR: ICD-10-PCS | Mod: S$GLB,,, | Performed by: INTERNAL MEDICINE

## 2019-08-28 PROCEDURE — 99213 PR OFFICE/OUTPT VISIT, EST, LEVL III, 20-29 MIN: ICD-10-PCS | Mod: S$GLB,,, | Performed by: INTERNAL MEDICINE

## 2019-08-28 PROCEDURE — 99213 OFFICE O/P EST LOW 20 MIN: CPT | Mod: S$GLB,,, | Performed by: INTERNAL MEDICINE

## 2019-08-28 PROCEDURE — 3079F PR MOST RECENT DIASTOLIC BLOOD PRESSURE 80-89 MM HG: ICD-10-PCS | Mod: S$GLB,,, | Performed by: INTERNAL MEDICINE

## 2019-08-28 PROCEDURE — 3077F SYST BP >= 140 MM HG: CPT | Mod: S$GLB,,, | Performed by: INTERNAL MEDICINE

## 2019-08-28 PROCEDURE — 1101F PT FALLS ASSESS-DOCD LE1/YR: CPT | Mod: S$GLB,,, | Performed by: INTERNAL MEDICINE

## 2019-08-28 PROCEDURE — 3077F PR MOST RECENT SYSTOLIC BLOOD PRESSURE >= 140 MM HG: ICD-10-PCS | Mod: S$GLB,,, | Performed by: INTERNAL MEDICINE

## 2019-08-28 PROCEDURE — 3079F DIAST BP 80-89 MM HG: CPT | Mod: S$GLB,,, | Performed by: INTERNAL MEDICINE

## 2019-08-28 NOTE — PROGRESS NOTES
Here to discuss neurology evaluation.    I agree with an MRA and with holding off on any temporal artery biopsy.    I will review the film once available.    I will see him back for his regular appointment.

## 2019-10-29 RX ORDER — CEVIMELINE HYDROCHLORIDE 30 MG/1
CAPSULE ORAL
Qty: 270 CAPSULE | Refills: 3 | Status: SHIPPED | OUTPATIENT
Start: 2019-10-29 | End: 2020-10-05

## 2019-11-19 ENCOUNTER — OFFICE VISIT (OUTPATIENT)
Dept: RHEUMATOLOGY | Facility: CLINIC | Age: 74
End: 2019-11-19
Payer: MEDICARE

## 2019-11-19 VITALS
DIASTOLIC BLOOD PRESSURE: 82 MMHG | SYSTOLIC BLOOD PRESSURE: 131 MMHG | BODY MASS INDEX: 30.16 KG/M2 | WEIGHT: 228.63 LBS

## 2019-11-19 DIAGNOSIS — Z79.899 ENCOUNTER FOR LONG-TERM (CURRENT) DRUG USE: ICD-10-CM

## 2019-11-19 DIAGNOSIS — M35.00 SJOGREN'S SYNDROME WITHOUT EXTRAGLANDULAR INVOLVEMENT: Primary | ICD-10-CM

## 2019-11-19 PROCEDURE — 1101F PR PT FALLS ASSESS DOC 0-1 FALLS W/OUT INJ PAST YR: ICD-10-PCS | Mod: S$GLB,,, | Performed by: INTERNAL MEDICINE

## 2019-11-19 PROCEDURE — 3079F PR MOST RECENT DIASTOLIC BLOOD PRESSURE 80-89 MM HG: ICD-10-PCS | Mod: S$GLB,,, | Performed by: INTERNAL MEDICINE

## 2019-11-19 PROCEDURE — 1159F MED LIST DOCD IN RCRD: CPT | Mod: S$GLB,,, | Performed by: INTERNAL MEDICINE

## 2019-11-19 PROCEDURE — 1101F PT FALLS ASSESS-DOCD LE1/YR: CPT | Mod: S$GLB,,, | Performed by: INTERNAL MEDICINE

## 2019-11-19 PROCEDURE — 3075F PR MOST RECENT SYSTOLIC BLOOD PRESS GE 130-139MM HG: ICD-10-PCS | Mod: S$GLB,,, | Performed by: INTERNAL MEDICINE

## 2019-11-19 PROCEDURE — 1125F PR PAIN SEVERITY QUANTIFIED, PAIN PRESENT: ICD-10-PCS | Mod: S$GLB,,, | Performed by: INTERNAL MEDICINE

## 2019-11-19 PROCEDURE — 3079F DIAST BP 80-89 MM HG: CPT | Mod: S$GLB,,, | Performed by: INTERNAL MEDICINE

## 2019-11-19 PROCEDURE — 99213 PR OFFICE/OUTPT VISIT, EST, LEVL III, 20-29 MIN: ICD-10-PCS | Mod: S$GLB,,, | Performed by: INTERNAL MEDICINE

## 2019-11-19 PROCEDURE — 1159F PR MEDICATION LIST DOCUMENTED IN MEDICAL RECORD: ICD-10-PCS | Mod: S$GLB,,, | Performed by: INTERNAL MEDICINE

## 2019-11-19 PROCEDURE — 99213 OFFICE O/P EST LOW 20 MIN: CPT | Mod: S$GLB,,, | Performed by: INTERNAL MEDICINE

## 2019-11-19 PROCEDURE — 1125F AMNT PAIN NOTED PAIN PRSNT: CPT | Mod: S$GLB,,, | Performed by: INTERNAL MEDICINE

## 2019-11-19 PROCEDURE — 3075F SYST BP GE 130 - 139MM HG: CPT | Mod: S$GLB,,, | Performed by: INTERNAL MEDICINE

## 2019-11-19 RX ORDER — PANTOPRAZOLE SODIUM 40 MG/1
40 TABLET, DELAYED RELEASE ORAL DAILY
Refills: 0 | COMMUNITY
Start: 2019-10-30

## 2019-11-19 NOTE — PROGRESS NOTES
[facility] RHEUMATOLOGY           Follow-up visit    Notes dictated to M*Modal. Please forgive any unintended errors.  Subjective:       Patient ID:   NAME: Nigel Marcus Jr. : 1945     74 y.o. male    Referring Doc: No ref. provider found  Other Physicians:    Chief Complaint:  Sjogren's syndrome      HPI/Interval History:  The patient is doing well.  He has no complaints of excessive dryness of the eyes or mouth.  He takes between 2 and 3 Evoxac daily    ROS:   GEN:    no fever, night sweats or weight loss  SKIN:   no rashes, erythema, bruising, or swelling, no Raynauds, no photosensitivity  HEENT: no changes in vision, no mouth ulcers, no sicca symptoms, no scalp tenderness, no jaw claudication.  CV:      no CP, PND, CHAPARRO, orthopnea, no palpitations  PULM: no SOB, cough, hemoptysis, sputum or pleuritic pain  GI:       no GERD, no dysphagia, no hematemesis, no abdominal pain, nausea, vomiting, constipation, diarrhea, melanotic stools, BRBPR  :      no hematuria, dysuria  NEURO: no paresthesias, headaches, acute visual disturbances  MUSCULOSKELETAL:  No muscle or joint complaints  PSYCH:   No increased insomnia, no increased anxiety, no increased depression    Past Medical/Surgical History:  Past Medical History:   Diagnosis Date    Dyslipidemia 2014    Gougerout-Sjoegren syndrome 4/3/2006    Inguinal hernia 2/10/2015     Past Surgical History:   Procedure Laterality Date    ACHILLES TENDON SURGERY      HEMORRHOID SURGERY         Allergies:  Review of patient's allergies indicates:   Allergen Reactions    Sulfa (sulfonamide antibiotics) Other (See Comments)     Ringing in ears       Social/Family History:  Social History     Socioeconomic History    Marital status:      Spouse name: Not on file    Number of children: Not on file    Years of education: Not on file    Highest education level: Not on file   Occupational History    Not on file   Social Needs    Financial resource  strain: Not on file    Food insecurity:     Worry: Not on file     Inability: Not on file    Transportation needs:     Medical: Not on file     Non-medical: Not on file   Tobacco Use    Smoking status: Never Smoker    Smokeless tobacco: Never Used   Substance and Sexual Activity    Alcohol use: Yes     Alcohol/week: 7.0 standard drinks     Types: 7 Shots of liquor per week    Drug use: No    Sexual activity: Not on file   Lifestyle    Physical activity:     Days per week: Not on file     Minutes per session: Not on file    Stress: Not on file   Relationships    Social connections:     Talks on phone: Not on file     Gets together: Not on file     Attends Alevism service: Not on file     Active member of club or organization: Not on file     Attends meetings of clubs or organizations: Not on file     Relationship status: Not on file   Other Topics Concern    Not on file   Social History Narrative    Not on file     Family History   Problem Relation Age of Onset    Cancer Father         prostate    Rheum arthritis Mother      FAMILY HISTORY: negative for Connective Tissue Disease      Medications:    Current Outpatient Medications:     aspirin (ECOTRIN) 81 MG EC tablet, Take 81 mg by mouth once daily., Disp: , Rfl:     cevimeline (EVOXAC) 30 mg capsule, TAKE 1 CAPSULE 2 TO 3 TIMESDAILY AS NEEDED FOR DRYNESS, Disp: 270 capsule, Rfl: 3    cycloSPORINE (RESTASIS) 0.05 % ophthalmic emulsion, Place 1 drop into both eyes 2 (two) times daily.  , Disp: , Rfl:     doxepin (SINEQUAN) 10 MG capsule, Take 10 mg by mouth every evening., Disp: , Rfl:     hydroxychloroquine (PLAQUENIL) 200 mg tablet, TAKE 1 TABLET DAILY, Disp: 90 tablet, Rfl: 3    hyoscyamine (LEVSIN/SL) 0.125 mg Subl, Place 0.125 mg under the tongue every 4 (four) hours as needed., Disp: , Rfl:     latanoprost (XALATAN) 0.005 % ophthalmic solution, Place 1 drop into both eyes every evening.  , Disp: , Rfl:     metoprolol succinate  (TOPROL-XL) 25 MG 24 hr tablet, TAKE 1 TABLET ONCE DAILY, Disp: 90 tablet, Rfl: 1    MULTIVITAMIN/IRON/FOLIC ACID (CENTRUM COMPLETE ORAL), Take 1 tablet by mouth once daily., Disp: , Rfl:     omega-3 fatty acids (FISH OIL) 500 mg Cap, Take by mouth., Disp: , Rfl:     pantoprazole (PROTONIX) 40 MG tablet, TK 1 T PO QAM, Disp: , Rfl: 0    vitamin E 400 UNIT capsule, Take 400 Units by mouth once daily., Disp: , Rfl:     VOLTAREN 1 % Gel, , Disp: , Rfl: 3    zinc gluconate 50 mg tablet, Take 50 mg by mouth once daily., Disp: , Rfl:       Objective:     Vitals:  Blood pressure 131/82, weight 103.7 kg (228 lb 9.6 oz).    Physical Examination:   GEN: wn/wd male in no apparent distress  SKIN: no rashes, no sclerodactyly, no Raynaud's, no periungual erythema, no digital tip ulcerations, no nailbed pitting  HEAD: no alopecia, no scalp tenderness, no temporal artery tenderness or induration.  EYES: no pallor, no icterus, PERRLA  ENT:  no thrush, no mucosal dryness or ulcerations, adequate oral hygiene & dentition.  NECK: supple x 6, no masses, no thyromegaly, no lymphadenopathy.  CV:   S1 and S2 regular, no murmurs, gallop or rubs  CHEST: Normal respiratory effort;  normal breath sounds/no adventitious sounds. No signs of consolidation.  ABD: non-tender and non-distended; soft; normal bowel sounds; no rebound/guarding or tenderness. No hepatosplenomegaly.  Musculoskeletal:  No evidence of inflammatory or degenerative disease progression  EXTREM: no clubbing, cyanosis or edema. normal pulses.  NEURO:  grossly intact; motor/sensory WNL; no tremors  PSYCH:  normal mood, affect and behavior    Labs:   Lab Results   Component Value Date    WBC 5.37 07/19/2019    HGB 14.6 07/19/2019    HCT 42.7 07/19/2019     (H) 07/19/2019     (L) 07/19/2019   CMP@  Sodium   Date Value Ref Range Status   09/09/2019 140 136 - 145 mmol/L Final   03/11/2019 139 134 - 144 mmol/L      Potassium   Date Value Ref Range Status    09/09/2019 4.1 3.5 - 5.1 mmol/L Final   08/31/2015 4.3 3.5 - 5.1 MMOL/L Final     Chloride   Date Value Ref Range Status   09/09/2019 104 95 - 110 mmol/L Final   03/11/2019 104 98 - 110 mmol/L      CO2   Date Value Ref Range Status   09/09/2019 28 22 - 31 mmol/L Final     Glucose   Date Value Ref Range Status   09/09/2019 89 70 - 110 mg/dL Final     Comment:     The ADA recommends the following guidelines for fasting glucose:  Normal:       less than 100 mg/dL  Prediabetes:  100 mg/dL to 125 mg/dL  Diabetes:     126 mg/dL or higher     03/11/2019 94 70 - 99 mg/dL      BUN, Bld   Date Value Ref Range Status   09/09/2019 17 9 - 21 mg/dL Final     Creatinine   Date Value Ref Range Status   09/09/2019 0.86 0.50 - 1.40 mg/dL Final   07/22/2019 0.91 0.60 - 1.40 mg/dL      Calcium   Date Value Ref Range Status   09/09/2019 8.9 8.4 - 10.2 mg/dL Final     Total Protein   Date Value Ref Range Status   09/09/2019 6.6 6.0 - 8.4 g/dL Final     Albumin   Date Value Ref Range Status   09/09/2019 3.9 3.5 - 5.2 g/dL Final   03/11/2019 3.9 3.1 - 4.7 g/dL      Total Bilirubin   Date Value Ref Range Status   09/09/2019 0.7 0.2 - 1.3 mg/dL Final     Alkaline Phosphatase   Date Value Ref Range Status   09/09/2019 75 38 - 145 U/L Final     AST   Date Value Ref Range Status   09/09/2019 31 17 - 59 U/L Final     ALT   Date Value Ref Range Status   09/09/2019 22 10 - 44 U/L Final     CRP   Date Value Ref Range Status   07/19/2019 <0.50 0.00 - 0.90 mg/dL Final       Radiology/Diagnostic Studies:    None    Assessment/Discussion/Plan:   74 y.o. male with Sjogren's syndrome- stable on hydroxychloroquine 200 mg daily and Evoxac 30 mg 2-3 times daily as needed    PLAN:  I will continue his medication without change.  Routine blood testing will be performed prior to his next appointment.    RTC:  I will see him back in 4 months.    Electronically signed by Nic Piña MD

## 2020-01-27 RX ORDER — HYDROXYCHLOROQUINE SULFATE 200 MG/1
TABLET, FILM COATED ORAL
Qty: 90 TABLET | Refills: 3 | Status: SHIPPED | OUTPATIENT
Start: 2020-01-27 | End: 2021-01-09 | Stop reason: SDUPTHER

## 2020-03-24 ENCOUNTER — TELEPHONE (OUTPATIENT)
Dept: RHEUMATOLOGY | Facility: CLINIC | Age: 75
End: 2020-03-24

## 2020-03-24 NOTE — TELEPHONE ENCOUNTER
Pt asks if he is not able to get plaquenil, what options does he have?  Should he buy some without insurance to ensure he has some?  He has six weeks left of plaquenil he takes one a day.  Please advise

## 2020-03-25 NOTE — TELEPHONE ENCOUNTER
He should begin taking Plaquenil 1 tablet every other day until he runs out.  If we are unable to get it renewed then, I am willing to have him go without it altogether unless things change for the worse.  Thanks

## 2020-06-16 ENCOUNTER — OFFICE VISIT (OUTPATIENT)
Dept: RHEUMATOLOGY | Facility: CLINIC | Age: 75
End: 2020-06-16
Payer: MEDICARE

## 2020-06-16 VITALS
WEIGHT: 225.63 LBS | TEMPERATURE: 97 F | DIASTOLIC BLOOD PRESSURE: 80 MMHG | SYSTOLIC BLOOD PRESSURE: 154 MMHG | BODY MASS INDEX: 29.76 KG/M2

## 2020-06-16 DIAGNOSIS — M35.00 SJOGREN'S SYNDROME, WITH UNSPECIFIED ORGAN INVOLVEMENT: Primary | ICD-10-CM

## 2020-06-16 DIAGNOSIS — Z79.899 ENCOUNTER FOR LONG-TERM (CURRENT) DRUG USE: ICD-10-CM

## 2020-06-16 PROCEDURE — 1125F AMNT PAIN NOTED PAIN PRSNT: CPT | Mod: S$GLB,,, | Performed by: INTERNAL MEDICINE

## 2020-06-16 PROCEDURE — 3079F PR MOST RECENT DIASTOLIC BLOOD PRESSURE 80-89 MM HG: ICD-10-PCS | Mod: S$GLB,,, | Performed by: INTERNAL MEDICINE

## 2020-06-16 PROCEDURE — 99213 PR OFFICE/OUTPT VISIT, EST, LEVL III, 20-29 MIN: ICD-10-PCS | Mod: S$GLB,,, | Performed by: INTERNAL MEDICINE

## 2020-06-16 PROCEDURE — 1159F PR MEDICATION LIST DOCUMENTED IN MEDICAL RECORD: ICD-10-PCS | Mod: S$GLB,,, | Performed by: INTERNAL MEDICINE

## 2020-06-16 PROCEDURE — 1101F PT FALLS ASSESS-DOCD LE1/YR: CPT | Mod: S$GLB,,, | Performed by: INTERNAL MEDICINE

## 2020-06-16 PROCEDURE — 3079F DIAST BP 80-89 MM HG: CPT | Mod: S$GLB,,, | Performed by: INTERNAL MEDICINE

## 2020-06-16 PROCEDURE — 1125F PR PAIN SEVERITY QUANTIFIED, PAIN PRESENT: ICD-10-PCS | Mod: S$GLB,,, | Performed by: INTERNAL MEDICINE

## 2020-06-16 PROCEDURE — 1159F MED LIST DOCD IN RCRD: CPT | Mod: S$GLB,,, | Performed by: INTERNAL MEDICINE

## 2020-06-16 PROCEDURE — 99213 OFFICE O/P EST LOW 20 MIN: CPT | Mod: S$GLB,,, | Performed by: INTERNAL MEDICINE

## 2020-06-16 PROCEDURE — 3077F PR MOST RECENT SYSTOLIC BLOOD PRESSURE >= 140 MM HG: ICD-10-PCS | Mod: S$GLB,,, | Performed by: INTERNAL MEDICINE

## 2020-06-16 PROCEDURE — 1101F PR PT FALLS ASSESS DOC 0-1 FALLS W/OUT INJ PAST YR: ICD-10-PCS | Mod: S$GLB,,, | Performed by: INTERNAL MEDICINE

## 2020-06-16 PROCEDURE — 3077F SYST BP >= 140 MM HG: CPT | Mod: S$GLB,,, | Performed by: INTERNAL MEDICINE

## 2020-06-16 NOTE — PROGRESS NOTES
[facility] RHEUMATOLOGY           Follow-up visit    Notes dictated to M*Modal. Please forgive any unintended errors.  Subjective:       Patient ID:   NAME: Nigel Marcus Jr. : 1945     74 y.o. male    Referring Doc: No ref. provider found  Other Physicians:    Chief Complaint:  Sjogren's syndrome      HPI/Interval History:  The patient is doing well.  He has not had any unusual dryness.  He had no difficulty obtaining hydroxychloroquine and he is therefore on 200 mg every day.    He is under quite a bit of stress.  His daughter, a 43-year-old , had a fall which left her paralyzed in the lower extremities and weak the right upper extremity.  She requires full care which the  is providing which requires the patient's health.  He is therefore commuting to [place] to the extent possible.    ROS:   GEN:    no fever, night sweats or weight loss  SKIN:   no rashes, erythema, bruising, or swelling, no Raynauds, no photosensitivity  HEENT: no changes in vision, no mouth ulcers, no sicca symptoms, no scalp tenderness, no jaw claudication.  CV:      no CP, PND, CHAPARRO, orthopnea, no palpitations  PULM: no SOB, cough, hemoptysis, sputum or pleuritic pain  GI:       no GERD, no dysphagia, no hematemesis, no abdominal pain, nausea, vomiting, constipation, diarrhea, melanotic stools, BRBPR  :      no hematuria, dysuria  NEURO: no paresthesias, headaches, acute visual disturbances  MUSCULOSKELETAL:  No red, hot, and/or swollen joints  PSYCH:   No increased insomnia, no increased anxiety, no increased depression    Past Medical/Surgical History:  Past Medical History:   Diagnosis Date    Dyslipidemia 2014    Gougerout-Sjoegren syndrome 4/3/2006    Inguinal hernia 2/10/2015     Past Surgical History:   Procedure Laterality Date    ACHILLES TENDON SURGERY      COLONOSCOPY  2020    Dr Ivy, received report     HEMORRHOID SURGERY         Allergies:  Review of patient's allergies indicates:    Allergen Reactions    Sulfa (sulfonamide antibiotics) Other (See Comments)     Ringing in ears       Social/Family History:  Social History     Socioeconomic History    Marital status:      Spouse name: Not on file    Number of children: Not on file    Years of education: Not on file    Highest education level: Not on file   Occupational History    Not on file   Social Needs    Financial resource strain: Not on file    Food insecurity     Worry: Not on file     Inability: Not on file    Transportation needs     Medical: Not on file     Non-medical: Not on file   Tobacco Use    Smoking status: Never Smoker    Smokeless tobacco: Never Used   Substance and Sexual Activity    Alcohol use: Yes     Alcohol/week: 7.0 standard drinks     Types: 7 Shots of liquor per week    Drug use: No    Sexual activity: Not on file   Lifestyle    Physical activity     Days per week: Not on file     Minutes per session: Not on file    Stress: Not on file   Relationships    Social connections     Talks on phone: Not on file     Gets together: Not on file     Attends Hindu service: Not on file     Active member of club or organization: Not on file     Attends meetings of clubs or organizations: Not on file     Relationship status: Not on file   Other Topics Concern    Not on file   Social History Narrative    Not on file     Family History   Problem Relation Age of Onset    Cancer Father         prostate    Rheum arthritis Mother      FAMILY HISTORY: negative for Connective Tissue Disease      Medications:    Current Outpatient Medications:     aspirin (ECOTRIN) 81 MG EC tablet, Take 81 mg by mouth once daily., Disp: , Rfl:     cevimeline (EVOXAC) 30 mg capsule, TAKE 1 CAPSULE 2 TO 3 TIMESDAILY AS NEEDED FOR DRYNESS, Disp: 270 capsule, Rfl: 3    cycloSPORINE (RESTASIS) 0.05 % ophthalmic emulsion, Place 1 drop into both eyes 2 (two) times daily.  , Disp: , Rfl:     doxepin (SINEQUAN) 10 MG capsule, Take  10 mg by mouth every evening., Disp: , Rfl:     hydroxychloroquine (PLAQUENIL) 200 mg tablet, TAKE 1 TABLET DAILY, Disp: 90 tablet, Rfl: 3    hyoscyamine (LEVSIN/SL) 0.125 mg Subl, Place 0.125 mg under the tongue every 4 (four) hours as needed., Disp: , Rfl:     latanoprost (XALATAN) 0.005 % ophthalmic solution, Place 1 drop into both eyes every evening.  , Disp: , Rfl:     metoprolol succinate (TOPROL-XL) 25 MG 24 hr tablet, TAKE 1 TABLET ONCE DAILY, Disp: 90 tablet, Rfl: 1    MULTIVITAMIN/IRON/FOLIC ACID (CENTRUM COMPLETE ORAL), Take 1 tablet by mouth once daily., Disp: , Rfl:     omega-3 fatty acids (FISH OIL) 500 mg Cap, Take by mouth., Disp: , Rfl:     pantoprazole (PROTONIX) 40 MG tablet, TK 1 T PO QAM, Disp: , Rfl: 0    vitamin E 400 UNIT capsule, Take 400 Units by mouth once daily., Disp: , Rfl:     VOLTAREN 1 % Gel, , Disp: , Rfl: 3    zinc gluconate 50 mg tablet, Take 50 mg by mouth once daily., Disp: , Rfl:       Objective:     Vitals:  Blood pressure (!) 154/80, temperature 97.3 °F (36.3 °C), weight 102.3 kg (225 lb 9.6 oz).    Physical Examination:   GEN: wn/wd male in no apparent distress  SKIN: no rashes, no sclerodactyly, no Raynaud's, no periungual erythema, no digital tip ulcerations, no nailbed pitting  HEAD: no alopecia, no scalp tenderness, no temporal artery tenderness or induration.  EYES: no pallor, no icterus, PERRLA  ENT:  no thrush, no mucosal dryness or ulcerations, adequate oral hygiene & dentition.  NECK: supple x 6, no masses, no thyromegaly, no lymphadenopathy.  CV:   S1 and S2 regular, no murmurs, gallop or rubs  CHEST: Normal respiratory effort;  normal breath sounds/no adventitious sounds. No signs of consolidation.  ABD: non-tender and non-distended; soft; normal bowel sounds; no rebound/guarding or tenderness. No hepatosplenomegaly.  Musculoskeletal:  No evidence of inflammatory or degenerative change   EXTREM: no clubbing, cyanosis or edema. normal pulses.  NEURO:   grossly intact; motor/sensory WNL; no tremors  PSYCH:  normal mood, affect and behavior    Labs:   Lab Results   Component Value Date    WBC 6.74 06/09/2020    HGB 15.1 06/09/2020    HCT 44.9 06/09/2020     (H) 06/09/2020     06/09/2020   CMP@  Sodium   Date Value Ref Range Status   06/09/2020 139 136 - 145 mmol/L Final   03/11/2019 139 134 - 144 mmol/L      Potassium   Date Value Ref Range Status   06/09/2020 4.0 3.5 - 5.1 mmol/L Final   08/31/2015 4.3 3.5 - 5.1 MMOL/L Final     Chloride   Date Value Ref Range Status   06/09/2020 104 95 - 110 mmol/L Final   03/11/2019 104 98 - 110 mmol/L      CO2   Date Value Ref Range Status   06/09/2020 31 22 - 31 mmol/L Final     Glucose   Date Value Ref Range Status   06/09/2020 97 70 - 110 mg/dL Final     Comment:     The ADA recommends the following guidelines for fasting glucose:  Normal:       less than 100 mg/dL  Prediabetes:  100 mg/dL to 125 mg/dL  Diabetes:     126 mg/dL or higher     03/11/2019 94 70 - 99 mg/dL      BUN, Bld   Date Value Ref Range Status   06/09/2020 18 9 - 21 mg/dL Final     Creatinine   Date Value Ref Range Status   06/09/2020 0.77 0.50 - 1.40 mg/dL Final   07/22/2019 0.91 0.60 - 1.40 mg/dL      Calcium   Date Value Ref Range Status   06/09/2020 9.0 8.4 - 10.2 mg/dL Final     Total Protein   Date Value Ref Range Status   06/09/2020 6.6 6.0 - 8.4 g/dL Final     Albumin   Date Value Ref Range Status   06/09/2020 3.8 3.5 - 5.2 g/dL Final   03/11/2019 3.9 3.1 - 4.7 g/dL      Total Bilirubin   Date Value Ref Range Status   06/09/2020 0.5 0.2 - 1.3 mg/dL Final     Alkaline Phosphatase   Date Value Ref Range Status   06/09/2020 91 38 - 145 U/L Final     AST   Date Value Ref Range Status   06/09/2020 37 17 - 59 U/L Final     ALT   Date Value Ref Range Status   06/09/2020 21 0 - 50 U/L Final     CRP   Date Value Ref Range Status   06/09/2020 <0.50 0.00 - 0.90 mg/dL Final       Radiology/Diagnostic Studies:    None    Assessment/Discussion/Plan:    74 y.o. male with Sjogren syndrome-good control on hydroxychloroquine 200 mg daily, Evoxac 30 mg 2-3 times daily, and Restasis    PLAN:  I will continue his medication without change.  Blood testing was reviewed and is within normal limits.  Subsequent blood tests were ordered and will be done just prior to his next appointment.    RTC:  I will see him back in 6 months    Electronically signed by Nic Piña MD

## 2020-12-14 ENCOUNTER — OFFICE VISIT (OUTPATIENT)
Dept: RHEUMATOLOGY | Facility: CLINIC | Age: 75
End: 2020-12-14
Payer: MEDICARE

## 2020-12-14 VITALS
DIASTOLIC BLOOD PRESSURE: 80 MMHG | BODY MASS INDEX: 30.11 KG/M2 | SYSTOLIC BLOOD PRESSURE: 130 MMHG | TEMPERATURE: 97 F | WEIGHT: 228.19 LBS

## 2020-12-14 DIAGNOSIS — M35.00 SJOGREN'S SYNDROME, WITH UNSPECIFIED ORGAN INVOLVEMENT: Primary | ICD-10-CM

## 2020-12-14 DIAGNOSIS — Z79.899 ENCOUNTER FOR LONG-TERM (CURRENT) DRUG USE: ICD-10-CM

## 2020-12-14 PROCEDURE — 1159F MED LIST DOCD IN RCRD: CPT | Mod: S$GLB,,, | Performed by: INTERNAL MEDICINE

## 2020-12-14 PROCEDURE — 1101F PT FALLS ASSESS-DOCD LE1/YR: CPT | Mod: S$GLB,,, | Performed by: INTERNAL MEDICINE

## 2020-12-14 PROCEDURE — 3079F PR MOST RECENT DIASTOLIC BLOOD PRESSURE 80-89 MM HG: ICD-10-PCS | Mod: S$GLB,,, | Performed by: INTERNAL MEDICINE

## 2020-12-14 PROCEDURE — 3079F DIAST BP 80-89 MM HG: CPT | Mod: S$GLB,,, | Performed by: INTERNAL MEDICINE

## 2020-12-14 PROCEDURE — 1101F PR PT FALLS ASSESS DOC 0-1 FALLS W/OUT INJ PAST YR: ICD-10-PCS | Mod: S$GLB,,, | Performed by: INTERNAL MEDICINE

## 2020-12-14 PROCEDURE — 3288F FALL RISK ASSESSMENT DOCD: CPT | Mod: S$GLB,,, | Performed by: INTERNAL MEDICINE

## 2020-12-14 PROCEDURE — 3288F PR FALLS RISK ASSESSMENT DOCUMENTED: ICD-10-PCS | Mod: S$GLB,,, | Performed by: INTERNAL MEDICINE

## 2020-12-14 PROCEDURE — 1125F PR PAIN SEVERITY QUANTIFIED, PAIN PRESENT: ICD-10-PCS | Mod: S$GLB,,, | Performed by: INTERNAL MEDICINE

## 2020-12-14 PROCEDURE — 3008F BODY MASS INDEX DOCD: CPT | Mod: S$GLB,,, | Performed by: INTERNAL MEDICINE

## 2020-12-14 PROCEDURE — 1125F AMNT PAIN NOTED PAIN PRSNT: CPT | Mod: S$GLB,,, | Performed by: INTERNAL MEDICINE

## 2020-12-14 PROCEDURE — 1159F PR MEDICATION LIST DOCUMENTED IN MEDICAL RECORD: ICD-10-PCS | Mod: S$GLB,,, | Performed by: INTERNAL MEDICINE

## 2020-12-14 PROCEDURE — 3075F PR MOST RECENT SYSTOLIC BLOOD PRESS GE 130-139MM HG: ICD-10-PCS | Mod: S$GLB,,, | Performed by: INTERNAL MEDICINE

## 2020-12-14 PROCEDURE — 99213 PR OFFICE/OUTPT VISIT, EST, LEVL III, 20-29 MIN: ICD-10-PCS | Mod: S$GLB,,, | Performed by: INTERNAL MEDICINE

## 2020-12-14 PROCEDURE — 99213 OFFICE O/P EST LOW 20 MIN: CPT | Mod: S$GLB,,, | Performed by: INTERNAL MEDICINE

## 2020-12-14 PROCEDURE — 3075F SYST BP GE 130 - 139MM HG: CPT | Mod: S$GLB,,, | Performed by: INTERNAL MEDICINE

## 2020-12-14 PROCEDURE — 3008F PR BODY MASS INDEX (BMI) DOCUMENTED: ICD-10-PCS | Mod: S$GLB,,, | Performed by: INTERNAL MEDICINE

## 2020-12-14 NOTE — PROGRESS NOTES
[facility] RHEUMATOLOGY           Follow-up visit    Notes dictated to M*Modal. Please forgive any unintended errors.  Subjective:       Patient ID:   NAME: Nigel Marcus Jr. : 1945     75 y.o. male    Referring Doc: No ref. provider found  Other Physicians:    Chief Complaint:  Sjogren's syndrome      HPI/Interval History:  The patient has been doing well.  Has no issues with increased dryness.    ROS:   GEN:    no fever, night sweats or weight loss  SKIN:   no rashes, bruising, or swelling, no Raynauds, no photosensitivity  HEENT: no changes in vision, no mouth ulcers, no sicca symptoms, no scalp tenderness, no jaw claudication.  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 Vivek Zhu        Allergies:  Review of patient's allergies indicates:   Allergen Reactions    Sulfa (sulfonamide antibiotics) Other (See Comments)     Ringing in ears       Social/Family History:  Social History     Socioeconomic History    Marital status:      Spouse name: Not on file    Number of children: Not on file    Years of education: Not on file    Highest education level: Not on  file   Occupational History    Not on file   Social Needs    Financial resource strain: Not on file    Food insecurity     Worry: Not on file     Inability: Not on file    Transportation needs     Medical: Not on file     Non-medical: Not on file   Tobacco Use    Smoking status: Never Smoker    Smokeless tobacco: Never Used   Substance and Sexual Activity    Alcohol use: Yes     Alcohol/week: 7.0 standard drinks     Types: 7 Shots of liquor per week    Drug use: No    Sexual activity: Not on file   Lifestyle    Physical activity     Days per week: Not on file     Minutes per session: Not on file    Stress: Not on file   Relationships    Social connections     Talks on phone: Not on file     Gets together: Not on file     Attends Muslim service: Not on file     Active member of club or organization: Not on file     Attends meetings of clubs or organizations: Not on file     Relationship status: Not on file   Other Topics Concern    Not on file   Social History Narrative    Not on file     Family History   Problem Relation Age of Onset    Cancer Father         prostate    Rheum arthritis Mother      FAMILY HISTORY: negative for Connective Tissue Disease      Medications:    Current Outpatient Medications:     cevimeline (EVOXAC) 30 mg capsule, TAKE 1 CAPSULE 2 TO 3 TIMESDAILY AS NEEDED FOR DRYNESS, Disp: 270 capsule, Rfl: 3    cycloSPORINE (RESTASIS) 0.05 % ophthalmic emulsion, Place 1 drop into both eyes 2 (two) times daily.  , Disp: , Rfl:     doxepin (SINEQUAN) 10 MG capsule, Take 10 mg by mouth every evening., Disp: , Rfl:     hydroxychloroquine (PLAQUENIL) 200 mg tablet, TAKE 1 TABLET DAILY, Disp: 90 tablet, Rfl: 3    hyoscyamine (LEVSIN/SL) 0.125 mg Subl, Place 0.125 mg under the tongue every 4 (four) hours as needed., Disp: , Rfl:     latanoprost (XALATAN) 0.005 % ophthalmic solution, Place 1 drop into both eyes every evening.  , Disp: , Rfl:     metoprolol succinate (TOPROL-XL) 25 MG  24 hr tablet, TAKE 1 TABLET ONCE DAILY, Disp: 90 tablet, Rfl: 1    MULTIVITAMIN/IRON/FOLIC ACID (CENTRUM COMPLETE ORAL), Take 1 tablet by mouth once daily., Disp: , Rfl:     pantoprazole (PROTONIX) 40 MG tablet, TK 1 T PO QAM, Disp: , Rfl: 0    VOLTAREN 1 % Gel, , Disp: , Rfl: 3    zinc gluconate 50 mg tablet, Take 50 mg by mouth once daily., Disp: , Rfl:     aspirin (ECOTRIN) 81 MG EC tablet, Take 81 mg by mouth once daily., Disp: , Rfl:     omega-3 fatty acids (FISH OIL) 500 mg Cap, Take by mouth., Disp: , Rfl:       Objective:     Vitals:  Blood pressure 130/80, temperature 96.6 °F (35.9 °C), weight 103.5 kg (228 lb 3.2 oz).    Physical Examination:   GEN: wn/wd male in no apparent distress  SKIN: no rashes, no sclerodactyly, no Raynaud's, no periungual erythema, no digital tip ulcerations, no nailbed pitting  HEAD: no alopecia, no scalp tenderness, no temporal artery tenderness or induration.  EYES: no pallor, no icterus, PERRLA  ENT:  no thrush, no mucosal dryness or ulcerations, adequate oral hygiene & dentition.  NECK: supple x 6, no masses, no thyromegaly, no lymphadenopathy.  CV:   S1 and S2 regular, no murmurs, gallop or rubs  CHEST: Normal respiratory effort;  normal breath sounds/no adventitious sounds. No signs of consolidation.  ABD: non-tender and non-distended; soft; normal bowel sounds; no rebound/guarding or tenderness. No hepatosplenomegaly.  Musculoskeletal:  No evidence of inflammatory arthritis  EXTREM: no clubbing, cyanosis or edema. normal pulses.  NEURO:  grossly intact; motor/sensory WNL; no tremors  PSYCH:  normal mood, affect and behavior    Labs:   Lab Results   Component Value Date    WBC 7.26 09/11/2020    HGB 14.5 09/11/2020    HCT 43.3 09/11/2020     (H) 09/11/2020     (L) 09/11/2020   CMP@  Sodium   Date Value Ref Range Status   09/11/2020 137 136 - 145 mmol/L Final   03/11/2019 139 134 - 144 mmol/L      Potassium   Date Value Ref Range Status   09/11/2020 4.0  3.5 - 5.1 mmol/L Final   08/31/2015 4.3 3.5 - 5.1 MMOL/L Final     Chloride   Date Value Ref Range Status   09/11/2020 103 95 - 110 mmol/L Final   03/11/2019 104 98 - 110 mmol/L      CO2   Date Value Ref Range Status   09/11/2020 29 22 - 31 mmol/L Final     Glucose   Date Value Ref Range Status   09/11/2020 86 70 - 110 mg/dL Final     Comment:     The ADA recommends the following guidelines for fasting glucose:  Normal:       less than 100 mg/dL  Prediabetes:  100 mg/dL to 125 mg/dL  Diabetes:     126 mg/dL or higher     03/11/2019 94 70 - 99 mg/dL      BUN   Date Value Ref Range Status   09/11/2020 15 9 - 21 mg/dL Final     Creatinine   Date Value Ref Range Status   09/11/2020 0.81 0.50 - 1.40 mg/dL Final   07/22/2019 0.91 0.60 - 1.40 mg/dL      Calcium   Date Value Ref Range Status   09/11/2020 8.7 8.4 - 10.2 mg/dL Final     Total Protein   Date Value Ref Range Status   09/11/2020 6.3 6.0 - 8.4 g/dL Final     Albumin   Date Value Ref Range Status   09/11/2020 3.6 3.5 - 5.2 g/dL Final   03/11/2019 3.9 3.1 - 4.7 g/dL      Total Bilirubin   Date Value Ref Range Status   09/11/2020 0.7 0.2 - 1.3 mg/dL Final     Alkaline Phosphatase   Date Value Ref Range Status   09/11/2020 88 38 - 145 U/L Final     AST   Date Value Ref Range Status   09/11/2020 38 17 - 59 U/L Final     ALT   Date Value Ref Range Status   09/11/2020 20 0 - 50 U/L Final     CRP   Date Value Ref Range Status   06/09/2020 <0.50 0.00 - 0.90 mg/dL Final       Radiology/Diagnostic Studies:    None    Assessment/Discussion/Plan:   75 y.o. male with Sjogren's- asymptomatic on hydroxychloroquine in 200 mg daily and Evoxac 30 mg twice daily    PLAN:  I will continue his medication unchanged.  Blood testing will be done in 5 months.    RTC:  I will see him back in 6 months    Electronically signed by Nic Piña MD

## 2020-12-24 PROBLEM — K40.91 RECURRENT INGUINAL HERNIA: Status: ACTIVE | Noted: 2020-12-24

## 2021-05-10 ENCOUNTER — PATIENT MESSAGE (OUTPATIENT)
Dept: RESEARCH | Facility: HOSPITAL | Age: 76
End: 2021-05-10

## 2021-06-14 ENCOUNTER — OFFICE VISIT (OUTPATIENT)
Dept: RHEUMATOLOGY | Facility: CLINIC | Age: 76
End: 2021-06-14
Payer: MEDICARE

## 2021-06-14 VITALS
SYSTOLIC BLOOD PRESSURE: 120 MMHG | DIASTOLIC BLOOD PRESSURE: 86 MMHG | BODY MASS INDEX: 29.25 KG/M2 | WEIGHT: 221.69 LBS

## 2021-06-14 DIAGNOSIS — Z79.899 ENCOUNTER FOR LONG-TERM (CURRENT) DRUG USE: ICD-10-CM

## 2021-06-14 DIAGNOSIS — M35.00 SJOGREN'S SYNDROME, WITH UNSPECIFIED ORGAN INVOLVEMENT: Primary | ICD-10-CM

## 2021-06-14 PROCEDURE — 99213 PR OFFICE/OUTPT VISIT, EST, LEVL III, 20-29 MIN: ICD-10-PCS | Mod: S$GLB,,, | Performed by: INTERNAL MEDICINE

## 2021-06-14 PROCEDURE — 99213 OFFICE O/P EST LOW 20 MIN: CPT | Mod: S$GLB,,, | Performed by: INTERNAL MEDICINE

## 2021-06-15 PROBLEM — J35.8: Status: ACTIVE | Noted: 2021-06-15

## 2021-10-11 ENCOUNTER — TELEPHONE (OUTPATIENT)
Dept: RHEUMATOLOGY | Facility: CLINIC | Age: 76
End: 2021-10-11

## 2021-12-19 ENCOUNTER — CLINICAL SUPPORT (OUTPATIENT)
Dept: URGENT CARE | Facility: CLINIC | Age: 76
End: 2021-12-19
Payer: MEDICARE

## 2021-12-19 DIAGNOSIS — Z11.52 ENCOUNTER FOR SCREENING FOR COVID-19: Primary | ICD-10-CM

## 2021-12-19 LAB
CTP QC/QA: YES
SARS-COV-2 RDRP RESP QL NAA+PROBE: NEGATIVE

## 2021-12-19 PROCEDURE — 99211 PR OFFICE/OUTPT VISIT, EST, LEVL I: ICD-10-PCS | Mod: S$GLB,CS,, | Performed by: PHYSICIAN ASSISTANT

## 2021-12-19 PROCEDURE — U0002 COVID-19 LAB TEST NON-CDC: HCPCS | Mod: QW,S$GLB,, | Performed by: EMERGENCY MEDICINE

## 2021-12-19 PROCEDURE — 99211 OFF/OP EST MAY X REQ PHY/QHP: CPT | Mod: S$GLB,CS,, | Performed by: PHYSICIAN ASSISTANT

## 2021-12-19 PROCEDURE — U0002: ICD-10-PCS | Mod: QW,S$GLB,, | Performed by: EMERGENCY MEDICINE

## 2022-01-06 DIAGNOSIS — M25.572 LEFT ANKLE PAIN, UNSPECIFIED CHRONICITY: Primary | ICD-10-CM

## 2022-01-10 ENCOUNTER — OFFICE VISIT (OUTPATIENT)
Dept: RHEUMATOLOGY | Facility: CLINIC | Age: 77
End: 2022-01-10
Payer: MEDICARE

## 2022-01-10 VITALS
WEIGHT: 221.88 LBS | SYSTOLIC BLOOD PRESSURE: 123 MMHG | BODY MASS INDEX: 29.28 KG/M2 | DIASTOLIC BLOOD PRESSURE: 76 MMHG

## 2022-01-10 DIAGNOSIS — Z79.899 ENCOUNTER FOR LONG-TERM (CURRENT) DRUG USE: ICD-10-CM

## 2022-01-10 DIAGNOSIS — M35.00 SJOGREN'S SYNDROME, WITH UNSPECIFIED ORGAN INVOLVEMENT: Primary | ICD-10-CM

## 2022-01-10 PROCEDURE — 1125F AMNT PAIN NOTED PAIN PRSNT: CPT | Mod: S$GLB,,, | Performed by: INTERNAL MEDICINE

## 2022-01-10 PROCEDURE — 3078F DIAST BP <80 MM HG: CPT | Mod: S$GLB,,, | Performed by: INTERNAL MEDICINE

## 2022-01-10 PROCEDURE — 3008F PR BODY MASS INDEX (BMI) DOCUMENTED: ICD-10-PCS | Mod: S$GLB,,, | Performed by: INTERNAL MEDICINE

## 2022-01-10 PROCEDURE — 1160F RVW MEDS BY RX/DR IN RCRD: CPT | Mod: S$GLB,,, | Performed by: INTERNAL MEDICINE

## 2022-01-10 PROCEDURE — 1101F PT FALLS ASSESS-DOCD LE1/YR: CPT | Mod: S$GLB,,, | Performed by: INTERNAL MEDICINE

## 2022-01-10 PROCEDURE — 1101F PR PT FALLS ASSESS DOC 0-1 FALLS W/OUT INJ PAST YR: ICD-10-PCS | Mod: S$GLB,,, | Performed by: INTERNAL MEDICINE

## 2022-01-10 PROCEDURE — 3008F BODY MASS INDEX DOCD: CPT | Mod: S$GLB,,, | Performed by: INTERNAL MEDICINE

## 2022-01-10 PROCEDURE — 1159F PR MEDICATION LIST DOCUMENTED IN MEDICAL RECORD: ICD-10-PCS | Mod: S$GLB,,, | Performed by: INTERNAL MEDICINE

## 2022-01-10 PROCEDURE — 3288F FALL RISK ASSESSMENT DOCD: CPT | Mod: S$GLB,,, | Performed by: INTERNAL MEDICINE

## 2022-01-10 PROCEDURE — 99213 OFFICE O/P EST LOW 20 MIN: CPT | Mod: S$GLB,,, | Performed by: INTERNAL MEDICINE

## 2022-01-10 PROCEDURE — 3074F SYST BP LT 130 MM HG: CPT | Mod: S$GLB,,, | Performed by: INTERNAL MEDICINE

## 2022-01-10 PROCEDURE — 99213 PR OFFICE/OUTPT VISIT, EST, LEVL III, 20-29 MIN: ICD-10-PCS | Mod: S$GLB,,, | Performed by: INTERNAL MEDICINE

## 2022-01-10 PROCEDURE — 3288F PR FALLS RISK ASSESSMENT DOCUMENTED: ICD-10-PCS | Mod: S$GLB,,, | Performed by: INTERNAL MEDICINE

## 2022-01-10 PROCEDURE — 3074F PR MOST RECENT SYSTOLIC BLOOD PRESSURE < 130 MM HG: ICD-10-PCS | Mod: S$GLB,,, | Performed by: INTERNAL MEDICINE

## 2022-01-10 PROCEDURE — 1125F PR PAIN SEVERITY QUANTIFIED, PAIN PRESENT: ICD-10-PCS | Mod: S$GLB,,, | Performed by: INTERNAL MEDICINE

## 2022-01-10 PROCEDURE — 1160F PR REVIEW ALL MEDS BY PRESCRIBER/CLIN PHARMACIST DOCUMENTED: ICD-10-PCS | Mod: S$GLB,,, | Performed by: INTERNAL MEDICINE

## 2022-01-10 PROCEDURE — 3078F PR MOST RECENT DIASTOLIC BLOOD PRESSURE < 80 MM HG: ICD-10-PCS | Mod: S$GLB,,, | Performed by: INTERNAL MEDICINE

## 2022-01-10 PROCEDURE — 1159F MED LIST DOCD IN RCRD: CPT | Mod: S$GLB,,, | Performed by: INTERNAL MEDICINE

## 2022-01-10 NOTE — PROGRESS NOTES
[facility] RHEUMATOLOGY           Follow-up visit    Notes dictated to M*Modal. Please forgive any unintended errors.  Subjective:       Patient ID:   NAME: Nigel Marcus Jr. : 1945     76 y.o. male    Referring Doc: No ref. provider found  Other Physicians:    Chief Complaint:  Sjogren's syndrome      HPI/Interval History:  The patient is doing very well.  He has no complaints of increased sicca symptoms.    ROS:   GEN:    no fever, night sweats or weight loss  SKIN:   no rashes, bruising, or swelling, no Raynauds, no photosensitivity  HEENT: no changes in vision, no mouth ulcers, no increased sicca symptoms, no scalp tenderness, no jaw claudication.  CV:      no CP, PND, CHAPARRO, orthopnea, no palpitations  PULM: no SOB, cough, hemoptysis, sputum or pleuritic pain  GI:        no dysphagia, no GERD, no hematemesis, no abdominal pain, nausea, vomiting, constipation, diarrhea, melanotic stools, BRBPR  :      no hematuria, dysuria  NEURO: no paresthesias, headaches, acute visual disturbances  MUSCULOSKELETAL:  No red, hot, and/or swollen joints  PSYCH:   No increased insomnia, no increased anxiety, no increased depression    Past Medical/Surgical History:  Past Medical History:   Diagnosis Date    Dyslipidemia 2014    GERD (gastroesophageal reflux disease)     Gougerout-Sjoegren 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;  Service: ENT;  Laterality: Left;    EYE SURGERY Left 2017    HEMORRHOID SURGERY      HERNIA REPAIR Bilateral 2015    bilateral inguinal hernias- Dr. Vivek Zhu     HERNIORRHAPHY OF RECURRENT INGUINAL HERNIA Left 2020    Procedure: REPAIR,  HERNIA, INGUINAL, RECURRENT;  Surgeon: Vivek Zhu MD;  Location: [facility] OR;  Service: General;  Laterality: Left;       Allergies:  Review of patient's allergies indicates:   Allergen Reactions    Sulfa (sulfonamide antibiotics) Other (See Comments)     Ringing in ears       Social/Family History:  Social History     Socioeconomic History    Marital status:    Tobacco Use    Smoking status: Never Smoker    Smokeless tobacco: Never Used   Substance and Sexual Activity    Alcohol use: Yes     Alcohol/week: 7.0 standard drinks     Types: 7 Shots of liquor per week    Drug use: No     Family History   Problem Relation Age of Onset    Cancer Father         prostate    Rheum arthritis Mother      FAMILY HISTORY: negative for Connective Tissue Disease      Medications:    Current Outpatient Medications:     aspirin (ECOTRIN) 81 MG EC tablet, Take 81 mg by mouth once daily., Disp: , Rfl:     cevimeline (EVOXAC) 30 mg capsule, TAKE 1 CAPSULE 2 TO 3 TIMESDAILY AS NEEDED FOR DRYNESS, Disp: 270 capsule, Rfl: 3    cholecalciferol, vitamin D3, (VITAMIN D3) 25 mcg (1,000 unit) capsule, Take 1,000 Units by mouth once daily., Disp: , Rfl:     cycloSPORINE (RESTASIS) 0.05 % ophthalmic emulsion, Place 1 drop into both eyes 2 (two) times daily., Disp: , Rfl:     doxepin (SINEQUAN) 10 MG capsule, Take 10 mg by mouth every evening., Disp: , Rfl:     hydrOXYchloroQUINE (PLAQUENIL) 200 mg tablet, Take 1 tablet (200 mg total) by mouth once daily., Disp: 90 tablet, Rfl: 3    hyoscyamine (LEVSIN/SL) 0.125 mg Subl, Place 0.125 mg under the tongue every 4 (four) hours as needed., Disp: , Rfl:     latanoprost (XALATAN) 0.005 % ophthalmic solution, Place 1 drop into both eyes every evening., Disp: , Rfl:     metoprolol succinate (TOPROL-XL) 25 MG 24 hr tablet, TAKE 1 TABLET ONCE DAILY, Disp: 90 tablet, Rfl: 1    mupirocin (BACTROBAN) 2 % ointment, 1 g by Nasal route 2 (two) times daily., Disp: , Rfl:     omega-3 fatty  acids 500 mg Cap, Take by mouth., Disp: , Rfl:     pantoprazole (PROTONIX) 40 MG tablet, Take 40 mg by mouth once daily. , Disp: , Rfl: 0    VOLTAREN 1 % Gel, Apply 2 g topically 2 (two) times daily as needed. , Disp: , Rfl: 3    zinc gluconate 50 mg tablet, Take 50 mg by mouth once daily., Disp: , Rfl:   No current facility-administered medications for this visit.    Facility-Administered Medications Ordered in Other Visits:     lactated ringers infusion, , Intravenous, Continuous, Tin Jacob MD    LIDOcaine (PF) 10 mg/ml (1%) injection 10 mg, 1 mL, Other, Once, Tin Jacob MD      Objective:     Vitals:  Blood pressure 123/76, weight 100.7 kg (221 lb 14.4 oz).    Physical Examination:   GEN: wn/wd male in no apparent distress  SKIN: no rashes, no sclerodactyly, no Raynaud's, no periungual erythema, no digital tip ulcerations, no nailbed pitting  HEAD: no alopecia, no scalp tenderness, no temporal artery tenderness or induration.  EYES: no pallor, no icterus, PERRLA  ENT:  no thrush, no mucosal dryness or ulcerations, adequate oral hygiene & dentition.  NECK: supple x 6, no masses, no thyromegaly, no lymphadenopathy.  CV:   S1 and S2 regular, no murmurs, gallop or rubs  CHEST: Normal respiratory effort;  normal breath sounds/no adventitious sounds. No signs of consolidation.  ABD: non-tender and non-distended; soft; normal bowel sounds; no rebound/guarding or tenderness. No hepatosplenomegaly.  Musculoskeletal:  No evidence of inflammatory arthritis  EXTREM: no clubbing, cyanosis or edema. normal pulses.  NEURO:  grossly intact; motor/sensory WNL; no tremors  PSYCH:  normal mood, affect and behavior    Labs:   Lab Results   Component Value Date    WBC 6.24 12/06/2021    HGB 13.8 (L) 12/06/2021    HCT 40.9 12/06/2021     (H) 12/06/2021     12/06/2021   CMP@  Sodium   Date Value Ref Range Status   12/06/2021 139 136 - 145 mmol/L Final   03/11/2019 139 134 - 144 mmol/L       Potassium   Date Value Ref Range Status   12/06/2021 4.3 3.5 - 5.1 mmol/L Final   08/31/2015 4.3 3.5 - 5.1 MMOL/L Final     Chloride   Date Value Ref Range Status   12/06/2021 103 95 - 110 mmol/L Final   03/11/2019 104 98 - 110 mmol/L      CO2   Date Value Ref Range Status   12/06/2021 31 22 - 31 mmol/L Final     Glucose   Date Value Ref Range Status   12/06/2021 75 70 - 110 mg/dL Final     Comment:     The ADA recommends the following guidelines for fasting glucose:    Normal:       less than 100 mg/dL    Prediabetes:  100 mg/dL to 125 mg/dL    Diabetes:     126 mg/dL or higher     03/11/2019 94 70 - 99 mg/dL      BUN   Date Value Ref Range Status   12/06/2021 21 9 - 21 mg/dL Final     Creatinine   Date Value Ref Range Status   12/06/2021 0.83 0.50 - 1.40 mg/dL Final   07/22/2019 0.91 0.60 - 1.40 mg/dL      Calcium   Date Value Ref Range Status   12/06/2021 8.9 8.4 - 10.2 mg/dL Final     Total Protein   Date Value Ref Range Status   09/14/2021 6.3 6.0 - 8.4 g/dL Final     Albumin   Date Value Ref Range Status   09/14/2021 3.6 3.5 - 5.2 g/dL Final   03/11/2019 3.9 3.1 - 4.7 g/dL      Total Bilirubin   Date Value Ref Range Status   09/14/2021 0.6 0.2 - 1.3 mg/dL Final     Alkaline Phosphatase   Date Value Ref Range Status   09/14/2021 88 38 - 145 U/L Final     AST   Date Value Ref Range Status   09/14/2021 41 17 - 59 U/L Final     ALT   Date Value Ref Range Status   09/14/2021 23 0 - 50 U/L Final     CRP   Date Value Ref Range Status   06/01/2021 <0.50 0.00 - 0.90 mg/dL Final       Radiology/Diagnostic Studies:    None    Assessment/Discussion/Plan:   76 y.o. male with Sjogren's-stable on Plaquenil 200 mg daily and Evoxac 30 mg twice daily    PLAN:  I will continue his medication without change.  Routine follow-up blood testing will be done in 4 months.    RTC:  I will see him back in 5 months        Electronically signed by Nic Piña MD

## 2022-01-11 ENCOUNTER — HOSPITAL ENCOUNTER (OUTPATIENT)
Dept: RADIOLOGY | Facility: HOSPITAL | Age: 77
Discharge: HOME | End: 2022-01-11
Attending: ORTHOPAEDIC SURGERY
Payer: MEDICARE

## 2022-01-11 ENCOUNTER — OFFICE VISIT (OUTPATIENT)
Dept: ORTHOPEDICS | Facility: CLINIC | Age: 77
End: 2022-01-11
Payer: MEDICARE

## 2022-01-11 VITALS
HEART RATE: 76 BPM | HEIGHT: 73 IN | WEIGHT: 222 LBS | DIASTOLIC BLOOD PRESSURE: 79 MMHG | BODY MASS INDEX: 29.42 KG/M2 | SYSTOLIC BLOOD PRESSURE: 148 MMHG

## 2022-01-11 DIAGNOSIS — M25.572 LEFT ANKLE PAIN, UNSPECIFIED CHRONICITY: ICD-10-CM

## 2022-01-11 DIAGNOSIS — M76.62 TENDONITIS, ACHILLES, LEFT: ICD-10-CM

## 2022-01-11 DIAGNOSIS — M25.572 LEFT ANKLE PAIN, UNSPECIFIED CHRONICITY: Primary | ICD-10-CM

## 2022-01-11 PROCEDURE — 99999 PR PBB SHADOW E&M-EST. PATIENT-LVL IV: ICD-10-PCS | Mod: PBBFAC,,, | Performed by: ORTHOPAEDIC SURGERY

## 2022-01-11 PROCEDURE — 1160F PR REVIEW ALL MEDS BY PRESCRIBER/CLIN PHARMACIST DOCUMENTED: ICD-10-PCS | Mod: CPTII,S$GLB,, | Performed by: ORTHOPAEDIC SURGERY

## 2022-01-11 PROCEDURE — 1126F AMNT PAIN NOTED NONE PRSNT: CPT | Mod: CPTII,S$GLB,, | Performed by: ORTHOPAEDIC SURGERY

## 2022-01-11 PROCEDURE — 1159F PR MEDICATION LIST DOCUMENTED IN MEDICAL RECORD: ICD-10-PCS | Mod: CPTII,S$GLB,, | Performed by: ORTHOPAEDIC SURGERY

## 2022-01-11 PROCEDURE — 73610 X-RAY EXAM OF ANKLE: CPT | Mod: 26,LT,, | Performed by: RADIOLOGY

## 2022-01-11 PROCEDURE — 73610 XR ANKLE COMPLETE 3 VIEW LEFT: ICD-10-PCS | Mod: 26,LT,, | Performed by: RADIOLOGY

## 2022-01-11 PROCEDURE — 1101F PT FALLS ASSESS-DOCD LE1/YR: CPT | Mod: CPTII,S$GLB,, | Performed by: ORTHOPAEDIC SURGERY

## 2022-01-11 PROCEDURE — 73650 X-RAY EXAM OF HEEL: CPT | Mod: TC,PO,LT

## 2022-01-11 PROCEDURE — 73610 X-RAY EXAM OF ANKLE: CPT | Mod: TC,PO,LT

## 2022-01-11 PROCEDURE — 1101F PR PT FALLS ASSESS DOC 0-1 FALLS W/OUT INJ PAST YR: ICD-10-PCS | Mod: CPTII,S$GLB,, | Performed by: ORTHOPAEDIC SURGERY

## 2022-01-11 PROCEDURE — 3008F BODY MASS INDEX DOCD: CPT | Mod: CPTII,S$GLB,, | Performed by: ORTHOPAEDIC SURGERY

## 2022-01-11 PROCEDURE — 3078F DIAST BP <80 MM HG: CPT | Mod: CPTII,S$GLB,, | Performed by: ORTHOPAEDIC SURGERY

## 2022-01-11 PROCEDURE — 3288F PR FALLS RISK ASSESSMENT DOCUMENTED: ICD-10-PCS | Mod: CPTII,S$GLB,, | Performed by: ORTHOPAEDIC SURGERY

## 2022-01-11 PROCEDURE — 73650 X-RAY EXAM OF HEEL: CPT | Mod: 26,LT,, | Performed by: RADIOLOGY

## 2022-01-11 PROCEDURE — 1126F PR PAIN SEVERITY QUANTIFIED, NO PAIN PRESENT: ICD-10-PCS | Mod: CPTII,S$GLB,, | Performed by: ORTHOPAEDIC SURGERY

## 2022-01-11 PROCEDURE — 1159F MED LIST DOCD IN RCRD: CPT | Mod: CPTII,S$GLB,, | Performed by: ORTHOPAEDIC SURGERY

## 2022-01-11 PROCEDURE — 99204 PR OFFICE/OUTPT VISIT, NEW, LEVL IV, 45-59 MIN: ICD-10-PCS | Mod: S$GLB,,, | Performed by: ORTHOPAEDIC SURGERY

## 2022-01-11 PROCEDURE — 3077F SYST BP >= 140 MM HG: CPT | Mod: CPTII,S$GLB,, | Performed by: ORTHOPAEDIC SURGERY

## 2022-01-11 PROCEDURE — 3077F PR MOST RECENT SYSTOLIC BLOOD PRESSURE >= 140 MM HG: ICD-10-PCS | Mod: CPTII,S$GLB,, | Performed by: ORTHOPAEDIC SURGERY

## 2022-01-11 PROCEDURE — 3008F PR BODY MASS INDEX (BMI) DOCUMENTED: ICD-10-PCS | Mod: CPTII,S$GLB,, | Performed by: ORTHOPAEDIC SURGERY

## 2022-01-11 PROCEDURE — 99204 OFFICE O/P NEW MOD 45 MIN: CPT | Mod: S$GLB,,, | Performed by: ORTHOPAEDIC SURGERY

## 2022-01-11 PROCEDURE — 1160F RVW MEDS BY RX/DR IN RCRD: CPT | Mod: CPTII,S$GLB,, | Performed by: ORTHOPAEDIC SURGERY

## 2022-01-11 PROCEDURE — 3078F PR MOST RECENT DIASTOLIC BLOOD PRESSURE < 80 MM HG: ICD-10-PCS | Mod: CPTII,S$GLB,, | Performed by: ORTHOPAEDIC SURGERY

## 2022-01-11 PROCEDURE — 73650 XR CALCANEUS 2 VIEW LEFT: ICD-10-PCS | Mod: 26,LT,, | Performed by: RADIOLOGY

## 2022-01-11 PROCEDURE — 3288F FALL RISK ASSESSMENT DOCD: CPT | Mod: CPTII,S$GLB,, | Performed by: ORTHOPAEDIC SURGERY

## 2022-01-11 PROCEDURE — 99999 PR PBB SHADOW E&M-EST. PATIENT-LVL IV: CPT | Mod: PBBFAC,,, | Performed by: ORTHOPAEDIC SURGERY

## 2022-01-11 NOTE — PROGRESS NOTES
"HPI: Nigel Marcus Jr. is a  76 y.o. male who complains of left achilles pain. He has h/o previous complex achilles rupture in 2003. He had surgery with my mentor, Dr. Yakov Mejia, at [facility] which consisted achilles repair with FHL tendon transfer and tendon lengthening. He also had wound healing complications at the time of that surgery.  He rates his pain as 0/10 today. He did have a flare up of tendonitis in 2016 and did see Dr. Mejia who prescribed therapy which helped. He was told at that time that there was some spurring that might need to be treated some day. Pt denies weakness, numbness, and tingling. The pain is intermittent in nature.        PAST MEDICAL/SURGICAL/FAMILY/SOCIAL/ HISTORY: REVIEWED    ALLERGIES/MEDICATIONS: REVIEWED       Review of Systems:     Constitution: Negative.   HEENT: Negative.   Eyes: Negative.   Cardiovascular: Negative.   Respiratory: Negative.   Endocrine: Negative.   Hematologic/Lymphatic: Negative.   Skin: Negative.   Musculoskeletal: Positive for left achilles pain   Gastrointestinal: Negative.   Genitourinary: Negative.   Neurological: Negative.   Psychiatric/Behavioral: Negative.   Allergic/Immunologic: Negative.       PHYSICAL EXAM:  Vitals:    01/11/22 0950   BP: (!) 148/79   Pulse: 76     Ht Readings from Last 1 Encounters:   01/11/22 6' 1" (1.854 m)     Wt Readings from Last 1 Encounters:   01/11/22 100.7 kg (222 lb 0.1 oz)       GENERAL: Well developed, well nourished, no acute distress. Pleasant.   SKIN: Skin is intact. No atrophy, abrasions or lesions are noted.   Neurological: Normal mental status. Appropriate and conversant. Alert and oriented x 3.  GAIT: Walks with a non-antalgic gait.    Left  lower extremity:  2+ dorsalis pedis pulse.  Capillary refill < 3 seconds.  Normal range of motion tibiotalar and subtalar joints. Mild pes cavus.  5/5 strength EHL, FHL, tibialis anterior, gastrocsoleus, tibialis posterior and peroneals. Sensation to light touch " intact sural, saphenous, superficial peroneal and deep peroneal nerves. No swelling, ecchymosis or deformity of the ankle. No lymphadenopathy, no masses or tumors palpated.  non-tender to palpation at the achilles insertion. Mild tenderness to palpation at the mid-substance of the achilles with mild fusiform swelling.     XRAYS:   3 views of left ankle obtained and reviewed today show old metal anchor in calcaneus. No evidence of fractures or dislocations. There is spurring posterior to the achilles not within the tendon itself.      ASSESSMENT:        Encounter Diagnoses   Name Primary?    Left ankle pain, unspecified chronicity Yes    Tendonitis, Achilles, left         PLAN:  I spent a total of 45 minutes on the day of the visit.This includes face to face time and non-face to face time preparing to see the patient (eg, review of tests), obtaining and/or reviewing separately obtained history, documenting clinical information in the electronic or other health record, independently interpreting results and communicating results to the patient/family/caregiver, or care coordinator.    I do not think the old spur it the cause of the pain as this is mid-substance achilles tendonitis.   PT 1/3.   Call if symptoms persist, and I will refer the pt to Dr. Partida for PRP vs Tenex.

## 2022-01-12 ENCOUNTER — CLINICAL SUPPORT (OUTPATIENT)
Dept: REHABILITATION | Facility: HOSPITAL | Age: 77
End: 2022-01-12
Attending: ORTHOPAEDIC SURGERY
Payer: MEDICARE

## 2022-01-12 DIAGNOSIS — R26.9 GAIT DIFFICULTY: ICD-10-CM

## 2022-01-12 DIAGNOSIS — M25.672 DECREASED RANGE OF MOTION OF LEFT ANKLE: ICD-10-CM

## 2022-01-12 DIAGNOSIS — M76.62 TENDONITIS, ACHILLES, LEFT: ICD-10-CM

## 2022-01-12 PROCEDURE — 97162 PT EVAL MOD COMPLEX 30 MIN: CPT | Mod: PO

## 2022-01-12 PROCEDURE — 97110 THERAPEUTIC EXERCISES: CPT | Mod: PO

## 2022-01-12 NOTE — PLAN OF CARE
[facility]  Physical Therapy Initial Evaluation    Name: Nigel Marcus Jr.  Clinic Number: [id number]    Therapy Diagnosis:   Encounter Diagnoses   Name Primary?    Tendonitis, Achilles, left     Decreased range of motion of left ankle     Gait difficulty      Physician: Dennis Tripp MD    Physician Orders: PT Eval and Treat Left mid-subst achilles tendonitis   1x week x 3 weeks   No ASTYM NO DRY NEEDLING  Medical Diagnosis from Referral: Tendonitis, Achilles, left   Evaluation Date: 1/12/2022  Authorization Period Expiration: 1/11/2023  Plan of Care Expiration: 02/11/2022  Visit # / Visits authorized: 1/ 1    Time In: 1035  Time Out: 1130  Total Billable Time: 55 minutes    Precautions: Standard    Subjective   Date of onset: Two months, reports achilles repair in 2003, recurrent history off pain since surgery  History of current condition - Nigel reports: He reports he began having increased ankle and achilles pain in 11/2021. He reports a history of (L) achilles repair in 2003. He reports he changed his shoe around the same time as he began experiencing his achilles pain a few months ago. Nigel reports he switched back to new balance shoes in November/December, which has mildly helped with pain. He states feels tightness at night and with walking 2-3 miles on the treadmill each day. He does note a bone spur. He has tried topical gel. He reports he has still been doing his exercises at home from his previous episode of physical therapy.        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  has a past surgical history that includes Achilles tendon surgery; Hemorrhoid surgery; Colonoscopy (03/04/2020); Hernia repair (Bilateral,  02/26/2015); Eye surgery (Left, 2017); Herniorrhaphy of recurrent inguinal hernia (Left, 12/24/2020); and Excisional biopsy (Left, 6/15/2021).    Medications:   Nigel has a current medication list which includes the following prescription(s): aspirin, cevimeline, cholecalciferol (vitamin d3), cyclosporine, doxepin, hydroxychloroquine, hyoscyamine, latanoprost, metoprolol succinate, mupirocin, omega-3 fatty acids, pantoprazole, voltaren, and zinc gluconate, and the following Facility-Administered Medications: lactated ringers and lidocaine (pf) 10 mg/ml (1%).    Allergies:   Review of patient's allergies indicates:   Allergen Reactions    Sulfa (sulfonamide antibiotics) Other (See Comments)     Ringing in ears        Imaging, X-ray: Dedicated images of the calcaneus demonstrate a plantar calcaneal spur and evidence of prior Achilles tendon insertion surgery. There is thickening of the soft tissues in the region the distal Achilles tendon and there are vascular calcifications about the ankle.     Prior Therapy: Yes  Social History: Lives with wife   Occupation: Retired   Prior Level of Function: No limitations with ADLs, hobbies walking  Current Level of Function: Increased pain with walking, ADLs, calf tightness    Pain:  Current 0/10, worst 3/10, best 0/10   Location: left ankles  Description: soreness, sticking  Aggravating Factors: Walking  Easing Factors: rest    Pts goals: To get back to walking as much as he wants to do    Objective     Observation: Patient in no acute distress    Posture: Stands with increased (L) Toe out     Gait: No significant deficits with gait    ROM:  Ankle Left   Dorsiflexion 5 (NT) degrees   Plantarflexion 50 (NT) degrees   Inversion 30 (NT) degrees   Eversion 20 (NT) degrees     MMT:  Right LE  Left LE    Knee extension: 5/5 Knee extension: 5/5   Knee flexion: 5/5 Knee flexion: 5/5   Hip flexion: 5/5 Hip flexion: 5/5   Hip extension:  4+/5 Hip extension: 4+/5   Hip abduction: 4+/5  Hip abduction: 4+/5       Ankle Left Right   Dorsiflexion 4+/5 5/5   Plantarflexion 4+/5 5/5   Inversion 4+/5 5/5   Eversion 4+/5 5/5     Special Tests:    Ankle Left Right   Anterior Drawer Test Negative Negative   Squeeze test Negative Negative   Vázquez test Negative Negative     Joint Mobility: Hypomobile (L) talocrural joint    Palpation: TTP (L) gastroc/soleus, achilles      Sensation: Intact to (B) LT     Flexibility: Limited at (B) hamstrings, GS,     Balance: Maintains SLS 10 seconds with good balance strategies. (R)  Maintains SLS 3s with fair balance strategies on (L)      CMS Impairment/Limitation/Restriction for FOTO Ankle Survey    Therapist reviewed FOTO scores for Nigel Marcus Jr. on 1/12/2022.   FOTO documents entered into [employer] - see Media section.    Limitation Score: 36%  Category: Mobility         TREATMENT   Treatment Time In: 1115  Treatment Time Out: 1130  Total Treatment time separate from Evaluation: 15 minutes    Nigel received therapeutic exercises to develop strength and ROM for 15 minutes including:  - Ankle 4D GTB 2 x 10   - Seated heel raise 2 x 10 10#   - Standing Gastroc stretch 2 x 30s      Home Exercises and Patient Education Provided    Education provided:   - Role of PT, PT POC  - HEP    Written Home Exercises Provided: yes.  Exercises were reviewed and Nigel was able to demonstrate them prior to the end of the session.  Nigel demonstrated good  understanding of the education provided.     See EMR under Media for exercises provided 1/12/2022.    Assessment   Nigel is a 76 y.o. male referred to outpatient Physical Therapy with a medical diagnosis of Tendonitis, Achilles, left . Physical exam is consistent with medical diagnosis. Good tolerance to ankle strengthening and GS stretch today. Primary impairments include AROM, PROM, joint mobility, strength, balance, soft tissue restrictions, and pain which limits functional mobility. This pt is a good candidate for skilled PT tx and  stands to benefit from a combination of therapeutic exercise to establish core/joint stability, neuromuscular re-educationand modalities Prn. The pt has been educated on their dx/POC and consents to further PT tx.    Pt prognosis is Good.   Pt will benefit from skilled outpatient Physical Therapy to address the deficits stated above and in the chart below, provide pt/family education, and to maximize pt's level of independence.     Plan of care discussed with patient: Yes  Pt's spiritual, cultural and educational needs considered and patient is agreeable to the plan of care and goals as stated below:     Anticipated Barriers for therapy: None    Medical Necessity is demonstrated by the following  History  Co-morbidities and personal factors that may impact the plan of care Co-morbidities:   Prior ankle surgery, advanced age    Personal Factors:   no deficits     moderate   Examination  Body Structures and Functions, activity limitations and participation restrictions that may impact the plan of care Body Regions:   lower extremities    Body Systems:    ROM  strength  gross coordinated movement  balance  gait    Participation Restrictions:   Limited with tolerance to ambulation, yardwork, exercises due to pain    Activity limitations:   Learning and applying knowledge  no deficits    General Tasks and Commands  no deficits    Communication  no deficits    Mobility  lifting and carrying objects  walking    Self care  no deficits    Domestic Life  doing house work (cleaning house, washing dishes, laundry)    Interactions/Relationships  no deficits    Life Areas  no deficits    Community and Social Life  community life  recreation and leisure         moderate   Clinical Presentation evolving clinical presentation with changing clinical characteristics Moderate   Decision Making/ Complexity Score: Moderate     Goals:  Short-Term Goals: 2 weeks  1) The patient will be independent and compliant with initial home exercise  program as prescribed by physical therapist.  2) The patient will increase activity range of motion in the left ankle by 5 degrees dorsiflexion in order to restore normal mobility for gait.    Long-Term Goals: 4 weeks  1) Pt to achieve <23% limitation as measured by the FOTO to demonstrate decreased disability.   2) The patient will increase strength in MMT of the left ankle to 5/5 in order to demonstrate improvements in functional strength for weight-bearing and gait.  3) Th patient will improve SLS >30s (B) to improve stability and balance for ambulation.  4) Pt will ambulate 1 mile with (L) ankle pain <2/10 on level surfaces.     Plan   Plan of care Certification: 1/12/2022 to 02/11/2022.    Outpatient Physical Therapy 1 times weekly for 3 weeks to include the following interventions: Manual Therapy, Moist Heat/ Ice, Neuromuscular Re-ed, Patient Education, Therapeutic Activities and Therapeutic Exercise.     Lul Muñoz, PT

## 2022-01-18 ENCOUNTER — CLINICAL SUPPORT (OUTPATIENT)
Dept: REHABILITATION | Facility: HOSPITAL | Age: 77
End: 2022-01-18
Attending: ORTHOPAEDIC SURGERY
Payer: MEDICARE

## 2022-01-18 DIAGNOSIS — M25.672 DECREASED RANGE OF MOTION OF LEFT ANKLE: ICD-10-CM

## 2022-01-18 DIAGNOSIS — R26.9 GAIT DIFFICULTY: ICD-10-CM

## 2022-01-18 PROCEDURE — 97110 THERAPEUTIC EXERCISES: CPT | Mod: PO

## 2022-01-18 NOTE — PROGRESS NOTES
Physical Therapy Daily Treatment Note     Name: Nigel Marcus Jr.  Clinic Number: [id number]    Therapy Diagnosis:   Encounter Diagnoses   Name Primary?    Decreased range of motion of left ankle     Gait difficulty      Physician: Dennis Tripp MD    Visit Date: 1/18/2022    Physician Orders: PT Eval and Treat Left mid-subst achilles tendonitis   1x week x 3 weeks   No ASTYM NO DRY NEEDLING  Medical Diagnosis from Referral: Tendonitis, Achilles, left   Evaluation Date: 1/12/2022  Authorization Period Expiration: 12/31/2022  Plan of Care Expiration: 02/11/2022  Visit # / Visits authorized: 1/ 20    Time In: 1030  Time Out: 1117  Total Billable Time: 24 minutes    Precautions: Standard    Subjective     Pt reports: He has noticed a decrease in frequency of pain since initial evaluation.  He was compliant with home exercise program.  Response to previous treatment: Improved pain  Functional change: Able to ambulate increased distance with less consistent pain    Pain: 0/10  Location: left ankles    Objective     Nigel received therapeutic exercises to develop strength, ROM and flexibility for 30 minutes including:  - Upright bike x 5 min  - Ankle 4D GTB 2 x 10   - Seated heel raise 2 x 10 10#   - Standing Gastroc stretch 2 x 30s  - Standing heel raise 2 x 10    - Shuttle Squat (DL) 2 x 10 87.5#  - Shuttle Heel Raise (DL) 2 x10 87.5#    - Standing hip extension 2 x 10 RTB  - Standing hip abduction 2 x 10 RTB    Nigel received the following manual therapy techniques: Joint mobilizations were applied to the: (L) ankle for 8 minutes, including:  - Talocrural joint mob grade I-IV    Nigel participated in neuromuscular re-education activities to improve: Balance, Coordination and Proprioception for 9 minutes. The following activities were included:  - Short foot x 2 min  - Towel crunch x 2 min   - Marbles x 2 min  - Tandem stance 2 x 30s    Home Exercises Provided and Patient Education Provided     Education  provided:   - Continue with HEP    Written Home Exercises Provided: yes.  Exercises were reviewed and Nigel was able to demonstrate them prior to the end of the session.  Nigel demonstrated good  understanding of the education provided.     See EMR under Media for exercises provided prior visit.    Assessment     Nigel reported decrease in frequency of pain since initial eval. He reports he has been able to walk 1-1.5 miles a few days without any increase in pain. Good tolerance to foot intrinsics and progression of strengthening exercises.     Nigel Is progressing well towards his goals.   Pt prognosis is Excellent.     Pt will continue to benefit from skilled outpatient physical therapy to address the deficits listed in the problem list box on initial evaluation, provide pt/family education and to maximize pt's level of independence in the home and community environment.     Pt's spiritual, cultural and educational needs considered and pt agreeable to plan of care and goals.    Anticipated barriers to physical therapy: None    Goals:   Short-Term Goals: 2 weeks  1) The patient will be independent and compliant with initial home exercise program as prescribed by physical therapist.  2) The patient will increase activity range of motion in the left ankle by 5 degrees dorsiflexion in order to restore normal mobility for gait.     Long-Term Goals: 4 weeks  1) Pt to achieve <23% limitation as measured by the FOTO to demonstrate decreased disability.   2) The patient will increase strength in MMT of the left ankle to 5/5 in order to demonstrate improvements in functional strength for weight-bearing and gait.  3) Th patient will improve SLS >30s (B) to improve stability and balance for ambulation.  4) Pt will ambulate 1 mile with (L) ankle pain <2/10 on level surfaces.     Plan     Continue with achilles strengthening, calf stretching    Lul Muñoz, PT

## 2022-01-24 NOTE — PROGRESS NOTES
Physical Therapy Daily Treatment Note     Name: Nigel Marcus Jr.  Clinic Number: [id number]    Therapy Diagnosis:   Encounter Diagnoses   Name Primary?    Decreased range of motion of left ankle     Gait difficulty      Physician: Dennis Tripp MD    Visit Date: 1/25/2022    Physician Orders: PT Eval and Treat Left mid-subst achilles tendonitis   1x week x 3 weeks   No ASTYM NO DRY NEEDLING  Medical Diagnosis from Referral: Tendonitis, Achilles, left   Evaluation Date: 1/12/2022  Authorization Period Expiration: 12/31/2022  Plan of Care Expiration: 02/11/2022  Visit # / Visits authorized: 2/ 20    Time In: 1026  Time Out: 1108  Total Billable Time: 42 minutes    Precautions: Standard    Subjective     Pt reports: He had one episode of pain over the last week when he was walking on his treadmill Sunday after about ~8 minutes. He notes he had been walking up to 30 min on the treadmill without exacerbation of h is pain.  He was compliant with home exercise program.  Response to previous treatment: Improved pain  Functional change: Able to ambulate increased distance with less consistent pain    Pain: 0/10   Location: left ankles    Objective     Nigel received therapeutic exercises to develop strength, ROM and flexibility for 26 minutes including:  - Upright bike x 5 min  - Ankle 4D BTB 2 x 10   - Seated heel raise 2 x 10 15#   - Standing Gastroc stretch 2 x 30s  - Standing heel raise 2 x 15    - Shuttle Squat (DL) 2 x 10 112.5#  - Shuttle Heel Raise (DL) 2 x10 112.5#    - Standing hip extension 2 x 10 RTB  - Standing hip abduction 2 x 10 RTB    Nigel received the following manual therapy techniques: Joint mobilizations were applied to the: (L) ankle for 6 minutes, including:  - Talocrural joint mob grade I-IV    Nigel participated in neuromuscular re-education activities to improve: Balance, Coordination and Proprioception for 10 minutes. The following activities were included:  - Short foot x 2 min  -  Marbles x 1 round  - Tandem stance 2 x 30s  - SLS (L) 1 finger support, (R) no UE support 2 x 30s    Home Exercises Provided and Patient Education Provided     Education provided:   - Continue with HEP    Written Home Exercises Provided: yes.  Exercises were reviewed and Nigel was able to demonstrate them prior to the end of the session.  Nigel demonstrated good  understanding of the education provided.     See EMR under Media for exercises provided prior visit.    Assessment     Nigel reports some mild improvement with his pain since beginning PT. He tolerated progression of load and volume of achilles strengthening well with no increase in symptoms with treatment today. Min sway with balance exercises in shoes. Challenged with (L) SLS with 1 finger UE support.     Nigel Is progressing well towards his goals.   Pt prognosis is Excellent.     Pt will continue to benefit from skilled outpatient physical therapy to address the deficits listed in the problem list box on initial evaluation, provide pt/family education and to maximize pt's level of independence in the home and community environment.     Pt's spiritual, cultural and educational needs considered and pt agreeable to plan of care and goals.    Anticipated barriers to physical therapy: None    Goals:   Short-Term Goals: 2 weeks  1) The patient will be independent and compliant with initial home exercise program as prescribed by physical therapist.  2) The patient will increase activity range of motion in the left ankle by 5 degrees dorsiflexion in order to restore normal mobility for gait.     Long-Term Goals: 4 weeks  1) Pt to achieve <23% limitation as measured by the FOTO to demonstrate decreased disability.   2) The patient will increase strength in MMT of the left ankle to 5/5 in order to demonstrate improvements in functional strength for weight-bearing and gait.  3) Th patient will improve SLS >30s (B) to improve stability and balance for  ambulation.  4) Pt will ambulate 1 mile with (L) ankle pain <2/10 on level surfaces.     Plan     Continue with achilles strengthening, calf stretching    Lul Muñoz, PT

## 2022-01-25 ENCOUNTER — CLINICAL SUPPORT (OUTPATIENT)
Dept: REHABILITATION | Facility: HOSPITAL | Age: 77
End: 2022-01-25
Attending: ORTHOPAEDIC SURGERY
Payer: MEDICARE

## 2022-01-25 DIAGNOSIS — M25.672 DECREASED RANGE OF MOTION OF LEFT ANKLE: ICD-10-CM

## 2022-01-25 DIAGNOSIS — R26.9 GAIT DIFFICULTY: ICD-10-CM

## 2022-01-25 PROCEDURE — 97110 THERAPEUTIC EXERCISES: CPT | Mod: PO

## 2022-01-25 PROCEDURE — 97112 NEUROMUSCULAR REEDUCATION: CPT | Mod: PO

## 2022-01-31 ENCOUNTER — TELEPHONE (OUTPATIENT)
Dept: ORTHOPEDICS | Facility: CLINIC | Age: 77
End: 2022-01-31
Payer: MEDICARE

## 2022-01-31 DIAGNOSIS — M76.62 TENDONITIS, ACHILLES, LEFT: Primary | ICD-10-CM

## 2022-01-31 NOTE — TELEPHONE ENCOUNTER
Called pt. Advised that Dr. Tripp wanted pt to see Dr. Partida if pain persists or worsens. Canceled appt with Dr. Tripp for today and scheduled with Dr. Partida for this Thursday afternoon. Thanks, Kerri

## 2022-01-31 NOTE — TELEPHONE ENCOUNTER
"----- Message from Birgit Joe sent at 1/31/2022  9:30 AM CST -----  "Type:  Patient Call Back    Who Called:JEMAL DIAMOND JR    What is the reqeust in detail:Pt requesting call back has some information he would like to email doctor before his appointment scheduled today at 3:15. Please advise     Can the clinic reply by [facility]? yes    Best Call Back Number:[phone]      Additional Information:Email can be sent to patient portal or call pt. He tried uploading documents on [employer]. Please advise            "

## 2022-01-31 NOTE — TELEPHONE ENCOUNTER
Attempted to contact pt by phone regarding appt that was scheduled for this afternoon with Dr. Tripp. No answer and no voicemail picked up after 10 rings. Pt needs to see Dr. Partida per Dr. Tripp's last note, not Dr. Tripp for continued pain. Will also send my chart message. Thanks, Kerri

## 2022-02-01 NOTE — PROGRESS NOTES
Physical Therapy Daily Treatment Note     Name: Nigel Marcus Jr.  Clinic Number: [id number]    Therapy Diagnosis:   Encounter Diagnoses   Name Primary?    Decreased range of motion of left ankle     Gait difficulty      Physician: Dennis Tripp MD    Visit Date: 2/4/2022    Physician Orders: PT Eval and Treat Left mid-subst achilles tendonitis   1x week x 3 weeks   No ASTYM NO DRY NEEDLING  Medical Diagnosis from Referral: Tendonitis, Achilles, left   Evaluation Date: 1/12/2022  Authorization Period Expiration: 12/31/2022  Plan of Care Expiration: 03/04/2022  Visit # / Visits authorized: 3/ 20    Time In: 1022  Time Out: 1121  Total Billable Time: 25 minutes    Precautions: Standard    Subjective     Pt reports: He saw Dr. Partida and he ordered more PT. One episode of pain on Sunday.  He was compliant with home exercise program.  Response to previous treatment: Improved pain  Functional change: Able to ambulate increased distance with less consistent pain    Pain: 0/10   Location: left ankles    Objective     Nigel received therapeutic exercises to develop strength, ROM and flexibility for 26 minutes including:  - Upright bike x 5 min  - Ankle 4D BTB 2 x 10   - Seated heel raise 3 x 10 15#   - Standing Gastroc stretch 2 x 30s  - Standing heel raise 3 x 15    - Mini Squats 2 x 10    - Standing hip extension 2 x 10 RTB  - Standing hip abduction 2 x 10 RTB    Nigel received the following manual therapy techniques: Joint mobilizations were applied to the: (L) ankle for 10 minutes, including:  - Talocrural joint mob grade I-IV    Nigel participated in neuromuscular re-education activities to improve: Balance, Coordination and Proprioception for 13 minutes. The following activities were included:  - Short foot x 2 min  - Marbles x 1 round  - Tandem stance 2 x 30s  - SLS (L) 1 finger support, (R) no UE support 2 x 30s    Home Exercises Provided and Patient Education Provided     Education provided:   -  Continue with HEP    Written Home Exercises Provided: yes.  Exercises were reviewed and Nigel was able to demonstrate them prior to the end of the session.  Nigel demonstrated good  understanding of the education provided.     See EMR under Media for exercises provided prior visit.    Assessment     Nigel followed up with Dr. Partida and he ordered more PT to focus on his strengthening and ROM. He has progressed well thus far with PT with subjective improvements with his ankle stiffness after each treatment. Held shuttle squats due to complaints of back pain. Goals and plan of care updated.     Nigel Is progressing well towards his goals.   Pt prognosis is Excellent.     Pt will continue to benefit from skilled outpatient physical therapy to address the deficits listed in the problem list box on initial evaluation, provide pt/family education and to maximize pt's level of independence in the home and community environment.     Pt's spiritual, cultural and educational needs considered and pt agreeable to plan of care and goals.    Anticipated barriers to physical therapy: None    Goals:  Short-Term Goals: 2 weeks  1) The patient will be independent and compliant with initial home exercise program as prescribed by physical therapist. (Met)  2) The patient will increase activity range of motion in the left ankle by 5 degrees dorsiflexion in order to restore normal mobility for gait. (Progressing, not met)    Long-Term Goals: 4 weeks  1) Pt to achieve <23% limitation as measured by the FOTO to demonstrate decreased disability.  (Progressing, not met)  2) The patient will increase strength in MMT of the left ankle to 5/5 in order to demonstrate improvements in functional strength for weight-bearing and gait. (Progressing, not met)  3) Th patient will improve SLS >30s (B) to improve stability and balance for ambulation. (Progressing, not met)  4) Pt will ambulate 1 mile with (L) ankle pain <2/10 on level surfaces.   (Met)     Plan     Plan of care updated, continue with achilles strengthening, calf stretching    Lul Muñoz, PT

## 2022-02-03 ENCOUNTER — TELEPHONE (OUTPATIENT)
Dept: PHYSICAL MEDICINE AND REHAB | Facility: CLINIC | Age: 77
End: 2022-02-03
Payer: MEDICARE

## 2022-02-03 ENCOUNTER — OFFICE VISIT (OUTPATIENT)
Dept: PHYSICAL MEDICINE AND REHAB | Facility: CLINIC | Age: 77
End: 2022-02-03
Payer: MEDICARE

## 2022-02-03 DIAGNOSIS — M76.62 TENDONITIS, ACHILLES, LEFT: ICD-10-CM

## 2022-02-03 PROCEDURE — 1160F RVW MEDS BY RX/DR IN RCRD: CPT | Mod: CPTII,S$GLB,, | Performed by: PHYSICAL MEDICINE & REHABILITATION

## 2022-02-03 PROCEDURE — 1101F PT FALLS ASSESS-DOCD LE1/YR: CPT | Mod: CPTII,S$GLB,, | Performed by: PHYSICAL MEDICINE & REHABILITATION

## 2022-02-03 PROCEDURE — 1159F MED LIST DOCD IN RCRD: CPT | Mod: CPTII,S$GLB,, | Performed by: PHYSICAL MEDICINE & REHABILITATION

## 2022-02-03 PROCEDURE — 3288F FALL RISK ASSESSMENT DOCD: CPT | Mod: CPTII,S$GLB,, | Performed by: PHYSICAL MEDICINE & REHABILITATION

## 2022-02-03 PROCEDURE — 3288F PR FALLS RISK ASSESSMENT DOCUMENTED: ICD-10-PCS | Mod: CPTII,S$GLB,, | Performed by: PHYSICAL MEDICINE & REHABILITATION

## 2022-02-03 PROCEDURE — 99999 PR PBB SHADOW E&M-EST. PATIENT-LVL III: ICD-10-PCS | Mod: PBBFAC,,, | Performed by: PHYSICAL MEDICINE & REHABILITATION

## 2022-02-03 PROCEDURE — 1160F PR REVIEW ALL MEDS BY PRESCRIBER/CLIN PHARMACIST DOCUMENTED: ICD-10-PCS | Mod: CPTII,S$GLB,, | Performed by: PHYSICAL MEDICINE & REHABILITATION

## 2022-02-03 PROCEDURE — 99204 PR OFFICE/OUTPT VISIT, NEW, LEVL IV, 45-59 MIN: ICD-10-PCS | Mod: S$GLB,,, | Performed by: PHYSICAL MEDICINE & REHABILITATION

## 2022-02-03 PROCEDURE — 1101F PR PT FALLS ASSESS DOC 0-1 FALLS W/OUT INJ PAST YR: ICD-10-PCS | Mod: CPTII,S$GLB,, | Performed by: PHYSICAL MEDICINE & REHABILITATION

## 2022-02-03 PROCEDURE — 99999 PR PBB SHADOW E&M-EST. PATIENT-LVL III: CPT | Mod: PBBFAC,,, | Performed by: PHYSICAL MEDICINE & REHABILITATION

## 2022-02-03 PROCEDURE — 1159F PR MEDICATION LIST DOCUMENTED IN MEDICAL RECORD: ICD-10-PCS | Mod: CPTII,S$GLB,, | Performed by: PHYSICAL MEDICINE & REHABILITATION

## 2022-02-03 PROCEDURE — 99204 OFFICE O/P NEW MOD 45 MIN: CPT | Mod: S$GLB,,, | Performed by: PHYSICAL MEDICINE & REHABILITATION

## 2022-02-03 NOTE — TELEPHONE ENCOUNTER
----- Message from Suzan Mercer sent at 2/3/2022  4:15 PM CST -----  Regarding: glasses will tomorrow around 10 am  Type: Needs Medical Advice  Who Called:  pt  Symptoms (please be specific):    How long has patient had these symptoms:    Pharmacy name and phone #:    Best Call Back Number: [phone] (home)     Additional Information: pt states he left some glasses  in the office today during his visit he states he will  on 02/04 @10 am víctor

## 2022-02-03 NOTE — PROGRESS NOTES
[facility]    Consulting Provider: Dr. Dennis Tripp    CHIEF COMPLAINT: Left ankle pain    HISTORY OF PRESENT ILLNESS: Nigel Marcus Jr. is a 76 y.o. male who presents to me for the 1st time for evaluation and treatment of left ankle pain.  He had traumatic rupture of the left Achilles tendon with subsequent surgical fixation with screw placement and FHL transfer in 2003. He did have some issues with the wound but he eventually recovered but has had some chronic tightness of the left ankle.  He developed about of tendinitis in 2016 and was treated with physical therapy with good results.  He rates his pain currently as a 4 on a scale of 1-10.  He feels like he aggravated the ankle this past weekend.  He recently completed a few sessions of physical therapy which seems to help somewhat.  He is generally able to walk without much issue but if he increases his distance or uses a treadmill he experiences some discomfort at the left Achilles.    Review of Systems   Constitutional: Negative for fever.   HENT: Negative for drooling.    Eyes: Negative for discharge.   Respiratory: Negative for choking.    Cardiovascular: Negative for chest pain.   Genitourinary: Negative for flank pain.   Skin: Negative for wound.   Allergic/Immunologic: Negative for immunocompromised state.   Neurological: Negative for tremors and syncope.   Psychiatric/Behavioral: Negative for behavioral problems.     Past Medical History:   Past Medical History:   Diagnosis Date    Dyslipidemia 8/25/2014    GERD (gastroesophageal reflux disease)     Gougerout-Sjoegren syndrome 4/3/2006    Inguinal hernia 2/10/2015    PVC's (premature ventricular contractions)        Past Surgical History:   Past Surgical History:   Procedure Laterality Date    ACHILLES TENDON SURGERY      COLONOSCOPY  03/04/2020    Dr Ivy, received report     EXCISIONAL BIOPSY Left 6/15/2021    Procedure: EXCISIONAL BIOPSY/ lesion /tonsil;  Surgeon:  Derek Ayala MD;  Location: [facility];  Service: ENT;  Laterality: Left;    EYE SURGERY Left 2017    HEMORRHOID SURGERY      HERNIA REPAIR Bilateral 02/26/2015    bilateral inguinal hernias- Dr. Vivek Zhu     HERNIORRHAPHY OF RECURRENT INGUINAL HERNIA Left 12/24/2020    Procedure: REPAIR, HERNIA, INGUINAL, RECURRENT;  Surgeon: Vivek Zhu MD;  Location: [facility];  Service: General;  Laterality: Left;       Family History:   Family History   Problem Relation Age of Onset    Cancer Father         prostate    Rheum arthritis Mother        Medications:   Current Outpatient Medications on File Prior to Visit   Medication Sig Dispense Refill    aspirin (ECOTRIN) 81 MG EC tablet Take 81 mg by mouth once daily.      cevimeline (EVOXAC) 30 mg capsule TAKE 1 CAPSULE 2 TO 3 TIMESDAILY AS NEEDED FOR DRYNESS 270 capsule 3    cholecalciferol, vitamin D3, (VITAMIN D3) 25 mcg (1,000 unit) capsule Take 1,000 Units by mouth once daily.      cycloSPORINE (RESTASIS) 0.05 % ophthalmic emulsion Place 1 drop into both eyes 2 (two) times daily.      doxepin (SINEQUAN) 10 MG capsule Take 10 mg by mouth every evening.      hydrOXYchloroQUINE (PLAQUENIL) 200 mg tablet Take 1 tablet (200 mg total) by mouth once daily. 90 tablet 3    hyoscyamine (LEVSIN/SL) 0.125 mg Subl Place 0.125 mg under the tongue every 4 (four) hours as needed.      latanoprost (XALATAN) 0.005 % ophthalmic solution Place 1 drop into both eyes every evening.      metoprolol succinate (TOPROL-XL) 25 MG 24 hr tablet TAKE 1 TABLET ONCE DAILY 90 tablet 1    mupirocin (BACTROBAN) 2 % ointment 1 g by Nasal route 2 (two) times daily.      omega-3 fatty acids 500 mg Cap Take by mouth.      pantoprazole (PROTONIX) 40 MG tablet Take 40 mg by mouth once daily.   0    VOLTAREN 1 % Gel Apply 2 g topically 2 (two) times daily as needed.   3    zinc gluconate 50 mg tablet Take 50 mg by mouth once daily.       Current Facility-Administered Medications on File  Prior to Visit   Medication Dose Route Frequency Provider Last Rate Last Admin    lactated ringers infusion   Intravenous Continuous Tin Jacob MD        LIDOcaine (PF) 10 mg/ml (1%) injection 10 mg  1 mL Other Once Tin Jacob MD           Allergies:   Review of patient's allergies indicates:   Allergen Reactions    Sulfa (sulfonamide antibiotics) Other (See Comments)     Ringing in ears       Social History:   Social History     Socioeconomic History    Marital status:    Tobacco Use    Smoking status: Never Smoker    Smokeless tobacco: Never Used   Substance and Sexual Activity    Alcohol use: Yes     Alcohol/week: 7.0 standard drinks     Types: 7 Shots of liquor per week    Drug use: No     Nigel is retired.    PHYSICAL EXAMINATION:   General  VSS  Constitutional: Oriented to person, place, and time. No apparent distress. Pleasant.  HENT:   Head: Normocephalic and atraumatic.   Eyes: Right eye exhibits no discharge. Left eye exhibits no discharge. No scleral icterus.   Pulmonary/Chest: Effort normal. No respiratory distress.   Abdominal: There is no guarding.   Neurological: Alert and oriented to person, place, and time.   Psychiatric: Behavior is normal.   Left Ankle Exam     Tenderness   Left ankle tenderness location: Mild tenderness at the distal Achilles insertion.     Range of Motion   Dorsiflexion: 15   Plantar flexion: 20     Muscle Strength   Dorsiflexion:  5/5   Plantar flexion:  5/5   Posterior tibial:  5/5  Peroneal muscle:  5/5    Other   Erythema: absent  Scars: present  Pulse: present        INSPECTION: There is no swelling, ecchymoses, erythema of the left ankle.  GAIT/DYNAMIC:  Slightly antalgic due to reduced dorsiflexion at the left ankle.    Imaging  X-ray of the left calcaneus from 01/11/2022: Dedicated images of the calcaneus demonstrate a plantar calcaneal spur and evidence of prior Achilles tendon insertion surgery.  There is thickening of the soft tissues  in the region the distal Achilles tendon and there are vascular calcifications about the ankle.    Data Reviewed: X-ray    Supportive Actions: Independent visualization of images or test specimens    ASSESSMENT:   1. Tendonitis, Achilles, left      PLAN:     1. Time was spent reviewing the above diagnosis in depth with Nigel today, including acute management and rehabilitation.     2. Diagnostic ultrasound exam today of the left Achilles did show significant heterogenous echotexture of the distal 2/3 of the tendon consistent with his prior surgical intervention there.  There was cortical irregularity of the calcaneal insertion site.  There is no significant hyperemia/neovascularization seen within the tendon substance.  I discussed with him that irregular tissue was to be expected considering the extensive surgery that he had.  He does have chronic reduction in range of motion about the left ankle which does predispose him to some pain and tendinopathy.  I would recommend he established in physical therapy to maximize his range of motion and strength about the left ankle.  We will help him reestablish in physical therapy here at [facility].  We discussed additional procedures to improve tendon health.  He has recently been seen by [facility] surgery in deemed not a surgical candidate.  We compared and contrasted the Tenex procedure versus injection of platelet rich plasma.  Due to the diffuse nature of his pathology of the Achilles on ultrasound today I recommended against the Tenex procedure.  This could also produce some theoretical weakening, which we should especially avoid considering his age and previous extensive surgery in this area.  We discussed platelet rich plasma could be perhaps of greater benefit to improve the tissue integrity and a less invasive matter.  We discussed this procedure does not guarantee complete symptom resolution or the need for further interventions.  He was issued some  "literature to review on PRP and may contact us if he would like to schedule for this procedure following more sessions of physical therapy.    3. RTC p.r.n..    This is a consult from Dr. Dennis Tripp. Please see the "Communications" section of Epic to see how the consulting physician received the report of today's findings and recommendations. If it's an [facility] physician, it will be forwarded to his/her "in basket".    The above note was completed, in part, with the aid of Dragon dictation software/hardware. Translation errors may be present.      "

## 2022-02-04 ENCOUNTER — CLINICAL SUPPORT (OUTPATIENT)
Dept: REHABILITATION | Facility: HOSPITAL | Age: 77
End: 2022-02-04
Attending: ORTHOPAEDIC SURGERY
Payer: MEDICARE

## 2022-02-04 DIAGNOSIS — R26.9 GAIT DIFFICULTY: ICD-10-CM

## 2022-02-04 DIAGNOSIS — M25.672 DECREASED RANGE OF MOTION OF LEFT ANKLE: ICD-10-CM

## 2022-02-04 PROCEDURE — 97110 THERAPEUTIC EXERCISES: CPT | Mod: PO

## 2022-02-04 NOTE — PLAN OF CARE
[facility]  Physical Therapy Reassessment and Plan of Care Update    Name: Nigel Marcus Jr.  Clinic Number: [id number]    Therapy Diagnosis:   Encounter Diagnoses   Name Primary?    Decreased range of motion of left ankle     Gait difficulty      Physician: Dennis Tripp MD    Physician Orders: PT Eval and Treat Left mid-subst achilles tendonitis   1x week x 3 weeks   No ASTYM NO DRY NEEDLING  Medical Diagnosis from Referral: Tendonitis, Achilles, left   Evaluation Date: 1/12/2022  Authorization Period Expiration: 12/31/2022  Plan of Care Expiration: 03/04/2022  Visit # / Visits authorized: 3/ 20    Precautions: Standard    Subjective   Date of onset: Two months, reports achilles repair in 2003, recurrent history off pain since surgery  History of current condition - Nigel reports: He reports he began having increased ankle and achilles pain in 11/2021. He reports a history of (L) achilles repair in 2003. He reports he changed his shoe around the same time as he began experiencing his achilles pain a few months ago. Nigel reports he switched back to new balance shoes in November/December, which has mildly helped with pain. He states feels tightness at night and with walking 2-3 miles on the treadmill each day. He does note a bone spur. He has tried topical gel. He reports he has still been doing his exercises at home from his previous episode of physical therapy.       Reassessment on 02/04/2022: Patient reports improvement with pain and ankle stiffness following PT treatments. He reports he still has stiffness and occasionally pain with increased levels of activity. He has been compliant with his HEP and followed up with Dr. Patrida.     Medical History:   Past Medical History:   Diagnosis Date    Dyslipidemia 8/25/2014    GERD (gastroesophageal reflux disease)     Gougerout-Sjoegren syndrome 4/3/2006    Inguinal hernia 2/10/2015    PVC's (premature ventricular  contractions)        Surgical History:   Nigel Marcus Jr.  has a past surgical history that includes Achilles tendon surgery; Hemorrhoid surgery; Colonoscopy (03/04/2020); Hernia repair (Bilateral, 02/26/2015); Eye surgery (Left, 2017); Herniorrhaphy of recurrent inguinal hernia (Left, 12/24/2020); and Excisional biopsy (Left, 6/15/2021).    Medications:   Nigel has a current medication list which includes the following prescription(s): aspirin, cevimeline, cholecalciferol (vitamin d3), cyclosporine, doxepin, hydroxychloroquine, hyoscyamine, latanoprost, metoprolol succinate, mupirocin, omega-3 fatty acids, pantoprazole, voltaren, and zinc gluconate, and the following Facility-Administered Medications: lactated ringers and lidocaine (pf) 10 mg/ml (1%).    Allergies:   Review of patient's allergies indicates:   Allergen Reactions    Sulfa (sulfonamide antibiotics) Other (See Comments)     Ringing in ears        Imaging, X-ray: Dedicated images of the calcaneus demonstrate a plantar calcaneal spur and evidence of prior Achilles tendon insertion surgery. There is thickening of the soft tissues in the region the distal Achilles tendon and there are vascular calcifications about the ankle.     Prior Therapy: Yes  Social History: Lives with wife   Occupation: Retired   Prior Level of Function: No limitations with ADLs, hobbies walking  Current Level of Function: Increased pain with walking, ADLs, calf tightness    Pain:  Current 0/10, worst 3/10, best 0/10   Location: left ankles  Description: soreness, sticking  Aggravating Factors: Walking  Easing Factors: rest    Pts goals: To get back to walking as much as he wants to do    Objective     Observation: Patient in no acute distress    Posture: Stands with increased (L) Toe out     Gait: No significant deficits with gait    ROM:  Ankle Left   Dorsiflexion 4 (NT) degrees   Plantarflexion 50 (NT) degrees   Inversion 32 (NT) degrees   Eversion 22 (NT) degrees      MMT:  Right LE  Left LE    Knee extension: 5/5 Knee extension: 5/5   Knee flexion: 5/5 Knee flexion: 5/5   Hip flexion: 5/5 Hip flexion: 5/5   Hip extension:  4+/5 Hip extension: 4+/5   Hip abduction: 4+/5 Hip abduction: 4+/5       Ankle Left Right   Dorsiflexion 5/5 5/5   Plantarflexion 4+/5 5/5   Inversion 5/5 5/5   Eversion 4+/5 5/5     Special Tests:    Ankle Left Right   Anterior Drawer Test Negative Negative   Squeeze test Negative Negative   Vázquez test Negative Negative     Joint Mobility: Hypomobile (L) talocrural joint    Palpation: TTP (L) gastroc/soleus, achilles      Sensation: Intact to (B) LT     Flexibility: Limited at (B) hamstrings, GS,     Balance: Maintains SLS 10 seconds with good balance strategies. (R)  Maintains SLS 15s with fair balance strategies on (L)      CMS Impairment/Limitation/Restriction for FOTO Ankle Survey    Therapist reviewed FOTO scores for Nigel Marcus  on 2/4/2022.   FOTO documents entered into EPIC - see Media section.    Limitation Score: 36%  Category: Mobility         TREATMENT     See daily note    Assessment   Nigel is a 76 y.o. male referred to outpatient Physical Therapy with a medical diagnosis of Tendonitis, Achilles, left . Since beginning PT, Nigel has been seen 3 times since initial evaluation on 01/12/2022. Overall, Nigel has made good, steady progress with his PT treatments and has worked hard towards all of his PT goals as evidenced by subjective and objective improvements. Good improvements with strength and pain. Despite these improvements, he remains with deficits with his ankle dorsiflexion ROM, impaired balance, and gait difficulty and will continue to benefit from skilled PT  to address remaining limitations and increase functional mobility.    Pt prognosis is Good.   Pt will benefit from skilled outpatient Physical Therapy to address the deficits stated above and in the chart below, provide pt/family education, and to maximize pt's level of  independence.     Plan of care discussed with patient: Yes  Pt's spiritual, cultural and educational needs considered and patient is agreeable to the plan of care and goals as stated below:     Anticipated Barriers for therapy: None    Medical Necessity is demonstrated by the following  History  Co-morbidities and personal factors that may impact the plan of care Co-morbidities:   Prior ankle surgery, advanced age    Personal Factors:   no deficits     moderate   Examination  Body Structures and Functions, activity limitations and participation restrictions that may impact the plan of care Body Regions:   lower extremities    Body Systems:    ROM  strength  gross coordinated movement  balance  gait    Participation Restrictions:   Limited with tolerance to ambulation, yardwork, exercises due to pain    Activity limitations:   Learning and applying knowledge  no deficits    General Tasks and Commands  no deficits    Communication  no deficits    Mobility  lifting and carrying objects  walking    Self care  no deficits    Domestic Life  doing house work (cleaning house, washing dishes, laundry)    Interactions/Relationships  no deficits    Life Areas  no deficits    Community and Social Life  community life  recreation and leisure         moderate   Clinical Presentation evolving clinical presentation with changing clinical characteristics Moderate   Decision Making/ Complexity Score: Moderate     Goals:  Short-Term Goals: 2 weeks  1) The patient will be independent and compliant with initial home exercise program as prescribed by physical therapist. (Met)  2) The patient will increase activity range of motion in the left ankle by 5 degrees dorsiflexion in order to restore normal mobility for gait. (Progressing, not met)    Long-Term Goals: 4 weeks  1) Pt to achieve <23% limitation as measured by the FOTO to demonstrate decreased disability.  (Progressing, not met)  2) The patient will increase strength in MMT of the  left ankle to 5/5 in order to demonstrate improvements in functional strength for weight-bearing and gait.(Progressing, not met)  3) Th patient will improve SLS >30s (B) to improve stability and balance for ambulation. (Progressing, not met)  4) Pt will ambulate 1 mile with (L) ankle pain <2/10 on level surfaces.  (Met)     Plan   Plan of care Certification: 2/4/2022 to 03/04/2022.    Outpatient Physical Therapy 2 times weekly for 8 visits to include the following interventions: Manual Therapy, Moist Heat/ Ice, Neuromuscular Re-ed, Patient Education, Therapeutic Activities and Therapeutic Exercise.     Lul Muñoz, PT

## 2022-02-07 NOTE — PROGRESS NOTES
Physical Therapy Daily Treatment Note     Name: Nigel Marcus Jr.  Clinic Number: [id number]    Therapy Diagnosis:   Encounter Diagnoses   Name Primary?    Decreased range of motion of left ankle     Gait difficulty      Physician: Dennis Tripp MD    Visit Date: 2/8/2022    Physician Orders: PT Eval and Treat Left mid-subst achilles tendonitis   1x week x 3 weeks   No ASTYM NO DRY NEEDLING  Medical Diagnosis from Referral: Tendonitis, Achilles, left   Evaluation Date: 1/12/2022  Authorization Period Expiration: 12/31/2022  Plan of Care Expiration: 03/04/2022  Visit # / Visits authorized: 4/ 20    Time In: 1130  Time Out: 1222  Total Billable Time: 26 minutes    Precautions: Standard    Subjective     Pt reports: He did a lot more walking over the last few days. Mild anterior ankle pain that passed. No increase in achilles or heel pain.   He was compliant with home exercise program.  Response to previous treatment: Improved pain  Functional change: Able to ambulate increased distance with less consistent pain    Pain: 0/10   Location: left ankles    Objective     Nigel received therapeutic exercises to develop strength, ROM and flexibility for 30 minutes including:  - Upright bike x 5 min  - Ankle 4D BTB 3 x 10   - Seated heel raise 3 x 10 20#   - Standing Gastroc stretch 2 x 30s  - Standing heel raise 3 x 15 off 2 inch step    - Mini Squats 2 x 10    - Matrix hip extension 2 x 10 40#  - Matrix hip abduction 2 x 10 40#    Nigel received the following manual therapy techniques: Joint mobilizations were applied to the: (L) ankle for 10 minutes, including:  - Talocrural joint mob grade I-IV    Nigel participated in neuromuscular re-education activities to improve: Balance, Coordination and Proprioception for 12 minutes. The following activities were included:  - Short foot x 2 min  - Marbles x 1 round  - Tandem stance 2 x 30s  - SLS (L) 1 finger support, (R) no UE support 2 x 30s    Home Exercises Provided  and Patient Education Provided     Education provided:   - Continue with HEP    Written Home Exercises Provided: yes.  Exercises were reviewed and Nigel was able to demonstrate them prior to the end of the session.  Nigel demonstrated good  understanding of the education provided.     See EMR under Media for exercises provided prior visit.    Assessment     Pt with good tolerance to progressive GS strengthening today with increased load and ROM added. He remains challenged with tandem and SL balance exercises. Will continue to progress as tolerated.     Nigel Is progressing well towards his goals.   Pt prognosis is Excellent.     Pt will continue to benefit from skilled outpatient physical therapy to address the deficits listed in the problem list box on initial evaluation, provide pt/family education and to maximize pt's level of independence in the home and community environment.     Pt's spiritual, cultural and educational needs considered and pt agreeable to plan of care and goals.    Anticipated barriers to physical therapy: None    Goals:  Short-Term Goals: 2 weeks  1) The patient will be independent and compliant with initial home exercise program as prescribed by physical therapist. (Met)  2) The patient will increase activity range of motion in the left ankle by 5 degrees dorsiflexion in order to restore normal mobility for gait. (Progressing, not met)    Long-Term Goals: 4 weeks  1) Pt to achieve <23% limitation as measured by the FOTO to demonstrate decreased disability.  (Progressing, not met)  2) The patient will increase strength in MMT of the left ankle to 5/5 in order to demonstrate improvements in functional strength for weight-bearing and gait. (Progressing, not met)  3) Th patient will improve SLS >30s (B) to improve stability and balance for ambulation. (Progressing, not met)  4) Pt will ambulate 1 mile with (L) ankle pain <2/10 on level surfaces.  (Met)     Plan     Plan of care updated,  continue with achilles strengthening, calf stretching    Lul Muñoz, PT

## 2022-02-08 ENCOUNTER — CLINICAL SUPPORT (OUTPATIENT)
Dept: REHABILITATION | Facility: HOSPITAL | Age: 77
End: 2022-02-08
Attending: ORTHOPAEDIC SURGERY
Payer: MEDICARE

## 2022-02-08 DIAGNOSIS — R26.9 GAIT DIFFICULTY: ICD-10-CM

## 2022-02-08 DIAGNOSIS — M25.672 DECREASED RANGE OF MOTION OF LEFT ANKLE: ICD-10-CM

## 2022-02-08 PROCEDURE — 97110 THERAPEUTIC EXERCISES: CPT | Mod: PO

## 2022-02-08 NOTE — PROGRESS NOTES
Physical Therapy Daily Treatment Note     Name: Nigel Marcus Jr.  Clinic Number: [id number]    Therapy Diagnosis:   Encounter Diagnoses   Name Primary?    Decreased range of motion of left ankle     Gait difficulty      Physician: Dennis Tripp MD    Visit Date: 2/10/2022    Physician Orders: PT Eval and Treat Left mid-subst achilles tendonitis   1x week x 3 weeks   No ASTYM NO DRY NEEDLING  Medical Diagnosis from Referral: Tendonitis, Achilles, left   Evaluation Date: 1/12/2022  Authorization Period Expiration: 12/31/2022  Plan of Care Expiration: 03/04/2022  Visit # / Visits authorized: 5/ 20    Time In: 1040  Time Out: 1122  Total Billable Time: 42 minutes    Precautions: Standard    Subjective     Pt reports: Increased muscle soreness following last visit. Mild pain with his first few steps this morning.  He was compliant with home exercise program.  Response to previous treatment: Improved pain   Functional change: Able to ambulate increased distance with less consistent pain    Pain: 0/10   Location: left ankles    Objective     Nigel received therapeutic exercises to develop strength, ROM and flexibility for 22 minutes including:  - Upright bike x 5 min  - Ankle 4D Black B 3 x 10   - Seated heel raise 3 x 10 20#   - Standing Gastroc stretch 2 x 30s  - Standing heel raise 3 x 15 off 2 inch step     - Mini Squats 2 x 10    - Matrix hip extension 2 x 10 40#  - Matrix hip abduction 2 x 10 40#    Nigel received the following manual therapy techniques: Joint mobilizations were applied to the: (L) ankle for 10 minutes, including:  - Talocrural joint mob grade I-IV    Nigel participated in neuromuscular re-education activities to improve: Balance, Coordination and Proprioception for 10 minutes. The following activities were included:  - Short foot x 2 min  - Marbles x 1 round - NP  - Tandem stance 2 x 30s (foam next visit)  - SLS (L) 1 finger support, (R) no UE support 2 x 30s    Home Exercises Provided  and Patient Education Provided     Education provided:   - Continue with HEP    Written Home Exercises Provided: yes.  Exercises were reviewed and Nigel was able to demonstrate them prior to the end of the session.  Nigel demonstrated good  understanding of the education provided.     See EMR under Media for exercises provided prior visit.    Assessment     Pt tolerated progression to black TB with ankle 4D well with no complaints of pain with or discomfort with treatment. He continues to respond well to increased GS strengthening. Did well with tandem stance and may be able to progress to foam next visit. Still challenged with SLS on level ground with need for intermittent UE support.     Nigel Is progressing well towards his goals.   Pt prognosis is Excellent.     Pt will continue to benefit from skilled outpatient physical therapy to address the deficits listed in the problem list box on initial evaluation, provide pt/family education and to maximize pt's level of independence in the home and community environment.     Pt's spiritual, cultural and educational needs considered and pt agreeable to plan of care and goals.    Anticipated barriers to physical therapy: None    Goals:  Short-Term Goals: 2 weeks  1) The patient will be independent and compliant with initial home exercise program as prescribed by physical therapist. (Met)  2) The patient will increase activity range of motion in the left ankle by 5 degrees dorsiflexion in order to restore normal mobility for gait. (Progressing, not met)    Long-Term Goals: 4 weeks  1) Pt to achieve <23% limitation as measured by the FOTO to demonstrate decreased disability.  (Progressing, not met)  2) The patient will increase strength in MMT of the left ankle to 5/5 in order to demonstrate improvements in functional strength for weight-bearing and gait. (Progressing, not met)  3) Th patient will improve SLS >30s (B) to improve stability and balance for ambulation.  (Progressing, not met)  4) Pt will ambulate 1 mile with (L) ankle pain <2/10 on level surfaces.  (Met)     Plan     Plan of care updated, continue with achilles strengthening, calf stretching    Lul Muñoz, PT

## 2022-02-10 ENCOUNTER — CLINICAL SUPPORT (OUTPATIENT)
Dept: REHABILITATION | Facility: HOSPITAL | Age: 77
End: 2022-02-10
Attending: ORTHOPAEDIC SURGERY
Payer: MEDICARE

## 2022-02-10 DIAGNOSIS — R26.9 GAIT DIFFICULTY: ICD-10-CM

## 2022-02-10 DIAGNOSIS — M25.672 DECREASED RANGE OF MOTION OF LEFT ANKLE: ICD-10-CM

## 2022-02-10 PROCEDURE — 97110 THERAPEUTIC EXERCISES: CPT | Mod: PO

## 2022-02-10 PROCEDURE — 97112 NEUROMUSCULAR REEDUCATION: CPT | Mod: PO

## 2022-02-14 NOTE — PROGRESS NOTES
Physical Therapy Daily Treatment Note     Name: Nigel Marcus Jr.  Clinic Number: [id number]    Therapy Diagnosis:   Encounter Diagnoses   Name Primary?    Decreased range of motion of left ankle     Gait difficulty      Physician: Dennis Tripp MD    Visit Date: 2/15/2022    Physician Orders: PT Eval and Treat Left mid-subst achilles tendonitis   1x week x 3 weeks   No ASTYM NO DRY NEEDLING  Medical Diagnosis from Referral: Tendonitis, Achilles, left   Evaluation Date: 1/12/2022  Authorization Period Expiration: 12/31/2022  Plan of Care Expiration: 03/04/2022  Visit # / Visits authorized: 6/ 20    Time In: 1128  Time Out: 1246  Total Billable Time: 24 minutes    Precautions: Standard    Subjective     Pt reports: He overdid it with the band at home leading to some mild achilles pain when walking. No pain today.   He was compliant with home exercise program.  Response to previous treatment: Improved pain   Functional change: Able to ambulate increased distance with less consistent pain    Pain: 0/10   Location: left ankles    Objective     Nigel received therapeutic exercises to develop strength, ROM and flexibility for 27 minutes including:  - Upright bike x 5 min  - Ankle 4D Black TB 3 x 10   - Seated heel raise 3 x 10 25#   - Standing Gastroc stretch 2 x 30s  - Standing heel raise 3 x 15 off 2 inch step 5#db     - Mini Squats 2 x 10    - Matrix hip extension 2 x 10 40#  - Matrix hip abduction 2 x 10 40#    Nigel received the following manual therapy techniques: Joint mobilizations were applied to the: (L) ankle for 10 minutes, including:  - Talocrural joint mob grade I-IV    Nigel participated in neuromuscular re-education activities to improve: Balance, Coordination and Proprioception for 9 minutes. The following activities were included:  - Standing short foot x 2 min  - Marbles x 1 round - NP  - Tandem stance 2 x 30s on foam  - SLS (L) 1 finger support, (R) no UE support 2 x 30s on foam    Home  Exercises Provided and Patient Education Provided     Education provided:   - Continue with HEP    Written Home Exercises Provided: yes.  Exercises were reviewed and Nigel was able to demonstrate them prior to the end of the session.  Nigel demonstrated good  understanding of the education provided.     See EMR under Media for exercises provided prior visit.    Assessment     Pt did well with progression to unstable surface with balance exercises. He did well with continued loading of achilles with both standing and seated heel raises. No pain with progression of exercises today.     Nigel Is progressing well towards his goals.   Pt prognosis is Excellent.     Pt will continue to benefit from skilled outpatient physical therapy to address the deficits listed in the problem list box on initial evaluation, provide pt/family education and to maximize pt's level of independence in the home and community environment.     Pt's spiritual, cultural and educational needs considered and pt agreeable to plan of care and goals.    Anticipated barriers to physical therapy: None    Goals:  Short-Term Goals: 2 weeks  1) The patient will be independent and compliant with initial home exercise program as prescribed by physical therapist. (Met)  2) The patient will increase activity range of motion in the left ankle by 5 degrees dorsiflexion in order to restore normal mobility for gait. (Progressing, not met)    Long-Term Goals: 4 weeks  1) Pt to achieve <23% limitation as measured by the FOTO to demonstrate decreased disability.  (Progressing, not met)  2) The patient will increase strength in MMT of the left ankle to 5/5 in order to demonstrate improvements in functional strength for weight-bearing and gait. (Progressing, not met)  3) Th patient will improve SLS >30s (B) to improve stability and balance for ambulation. (Progressing, not met)  4) Pt will ambulate 1 mile with (L) ankle pain <2/10 on level surfaces.  (Met)     Plan      Plan of care updated, continue with achilles strengthening, calf stretching    Lul Muñoz, PT

## 2022-02-15 ENCOUNTER — CLINICAL SUPPORT (OUTPATIENT)
Dept: REHABILITATION | Facility: HOSPITAL | Age: 77
End: 2022-02-15
Attending: ORTHOPAEDIC SURGERY
Payer: MEDICARE

## 2022-02-15 DIAGNOSIS — M25.672 DECREASED RANGE OF MOTION OF LEFT ANKLE: ICD-10-CM

## 2022-02-15 DIAGNOSIS — R26.9 GAIT DIFFICULTY: ICD-10-CM

## 2022-02-15 PROCEDURE — 97110 THERAPEUTIC EXERCISES: CPT | Mod: PO

## 2022-02-16 NOTE — PROGRESS NOTES
Physical Therapy Daily Treatment Note     Name: Nigel Marcus Jr.  Clinic Number: [id number]    Therapy Diagnosis:   Encounter Diagnoses   Name Primary?    Decreased range of motion of left ankle     Gait difficulty      Physician: Dennis Tripp MD    Visit Date: 2/17/2022    Physician Orders: PT Eval and Treat Left mid-subst achilles tendonitis   1x week x 3 weeks   No ASTYM NO DRY NEEDLING  Medical Diagnosis from Referral: Tendonitis, Achilles, left   Evaluation Date: 1/12/2022  Authorization Period Expiration: 12/31/2022  Plan of Care Expiration: 03/04/2022  Visit # / Visits authorized: 7/ 20    Time In: 1108  Time Out: 1153  Total Billable Time: 45 minutes    Precautions: Standard    Subjective     Pt reports: He did not exercise yesterday and had mild soreness in his achilles this morning, which improved almost immediately after walking.    He was compliant with home exercise program.  Response to previous treatment: Improved pain   Functional change: Able to ambulate increased distance with less consistent pain    Pain: 0/10   Location: left ankles    Objective     Nigel received therapeutic exercises to develop strength, ROM and flexibility for 25 minutes including:  - Upright bike x 5 min  - Ankle 4D Black TB 3 x 10   - Seated heel raise 3 x 10 25#   - Standing Gastroc stretch x 2 min  - Standing heel raise 3 x 15 off 2 inch step 5#db     - Mini Squats 2 x 10 - NP    - Matrix hip extension 2 x 10 40#  - Matrix hip abduction 2 x 10 40#    Nigel received the following manual therapy techniques: Joint mobilizations were applied to the: (L) ankle for 12 minutes, including:  - Talocrural joint mob grade I-IV    Nigel participated in neuromuscular re-education activities to improve: Balance, Coordination and Proprioception for 8 minutes. The following activities were included:  - Tandem stance 2 x 30s on foam  - SLS (L) 1 finger support, (R) no UE support 2 x 30s on ground  - Step ups x 10 8  inch    Home Exercises Provided and Patient Education Provided     Education provided:   - Continue with HEP    Written Home Exercises Provided: yes.  Exercises were reviewed and Nigel was able to demonstrate them prior to the end of the session.  Nigel demonstrated good  understanding of the education provided.     See EMR under Media for exercises provided prior visit.    Assessment     Pt continues to respond well to progressive calf stretching and strengthening with improvements in pain noted with activity and at rest at home. Good tolerance to all exercises again today.     Nigel Is progressing well towards his goals.   Pt prognosis is Excellent.     Pt will continue to benefit from skilled outpatient physical therapy to address the deficits listed in the problem list box on initial evaluation, provide pt/family education and to maximize pt's level of independence in the home and community environment.     Pt's spiritual, cultural and educational needs considered and pt agreeable to plan of care and goals.    Anticipated barriers to physical therapy: None    Goals:  Short-Term Goals: 2 weeks  1) The patient will be independent and compliant with initial home exercise program as prescribed by physical therapist. (Met)  2) The patient will increase activity range of motion in the left ankle by 5 degrees dorsiflexion in order to restore normal mobility for gait. (Progressing, not met)    Long-Term Goals: 4 weeks  1) Pt to achieve <23% limitation as measured by the FOTO to demonstrate decreased disability.  (Progressing, not met)  2) The patient will increase strength in MMT of the left ankle to 5/5 in order to demonstrate improvements in functional strength for weight-bearing and gait. (Progressing, not met)  3) Th patient will improve SLS >30s (B) to improve stability and balance for ambulation. (Progressing, not met)  4) Pt will ambulate 1 mile with (L) ankle pain <2/10 on level surfaces.  (Met)     Plan      Plan of care updated, continue with achilles strengthening, calf stretching    Lul Muñoz, PT

## 2022-02-17 ENCOUNTER — CLINICAL SUPPORT (OUTPATIENT)
Dept: REHABILITATION | Facility: HOSPITAL | Age: 77
End: 2022-02-17
Attending: ORTHOPAEDIC SURGERY
Payer: MEDICARE

## 2022-02-17 DIAGNOSIS — R26.9 GAIT DIFFICULTY: ICD-10-CM

## 2022-02-17 DIAGNOSIS — M25.672 DECREASED RANGE OF MOTION OF LEFT ANKLE: ICD-10-CM

## 2022-02-17 PROCEDURE — 97110 THERAPEUTIC EXERCISES: CPT | Mod: PO

## 2022-02-17 PROCEDURE — 97140 MANUAL THERAPY 1/> REGIONS: CPT | Mod: PO

## 2022-02-21 NOTE — PROGRESS NOTES
Physical Therapy Daily Treatment Note     Name: Nigel Marcus Jr.  Clinic Number: [id number]    Therapy Diagnosis:   Encounter Diagnoses   Name Primary?    Decreased range of motion of left ankle Yes    Gait difficulty      Physician: Dennis Tripp MD    Visit Date: 2/22/2022    Physician Orders: PT Eval and Treat Left mid-subst achilles tendonitis   1x week x 3 weeks   No ASTYM NO DRY NEEDLING  Medical Diagnosis from Referral: Tendonitis, Achilles, left   Evaluation Date: 1/12/2022  Authorization Period Expiration: 12/31/2022  Plan of Care Expiration: 03/04/2022  Visit # / Visits authorized: 8/ 20    Time In: 1335  Time Out: 1422  Total Billable Time: 47 minutes    Precautions: Standard    Subjective     Pt reports: He has been trying to figure out how he can set up for his wife to perform talocrural joint mobs after completion of PT. No increase in pain or soreness over the weekend.   He was compliant with home exercise program.  Response to previous treatment: Improved pain   Functional change: Able to ambulate increased distance with less consistent pain    Pain: 0/10   Location: left ankles    Objective     Nigel received therapeutic exercises to develop strength, ROM and flexibility for 25 minutes including:  - Upright bike x 5 min  - Ankle 4D Black TB 3 x 10   - Seated heel raise 3 x 15 25#   - Standing Gastroc stretch x 2 min  - Standing heel raise 3 x 15 off 2 inch step 8#dbs     - Mini Squats 2 x 10 - NP    - Matrix hip extension 2 x 10 40#  - Matrix hip abduction 2 x 10 40#    Nigel received the following manual therapy techniques: Joint mobilizations were applied to the: (L) ankle for 12 minutes, including:  - Talocrural joint mob grade I-IV    Nigel participated in neuromuscular re-education activities to improve: Balance, Coordination and Proprioception for 10 minutes. The following activities were included:  - Tandem stance 2 x 30s on foam  - SLS (L) 1 finger support, (R) no UE support 2 x  30s on ground  - Step ups  x 10 8 inch    Home Exercises Provided and Patient Education Provided     Education provided:   - Continue with HEP    Written Home Exercises Provided: yes.  Exercises were reviewed and Nigel was able to demonstrate them prior to the end of the session.  Nigel demonstrated good  understanding of the education provided.     See EMR under Media for exercises provided prior visit.    Assessment     Pt continues to tolerate progression of LE strengthening well with no increase in pain with treatment today. Increased resistance for standing calf raises, which he tolerated well. . Good control with balance exercises today. Plan to discharge with [facility] next visit    Nigel Is progressing well towards his goals.   Pt prognosis is Excellent.     Pt will continue to benefit from skilled outpatient physical therapy to address the deficits listed in the problem list box on initial evaluation, provide pt/family education and to maximize pt's level of independence in the home and community environment.     Pt's spiritual, cultural and educational needs considered and pt agreeable to plan of care and goals.    Anticipated barriers to physical therapy: None    Goals:  Short-Term Goals: 2 weeks  1) The patient will be independent and compliant with initial home exercise program as prescribed by physical therapist. (Met)  2) The patient will increase activity range of motion in the left ankle by 5 degrees dorsiflexion in order to restore normal mobility for gait. (Progressing, not met)    Long-Term Goals: 4 weeks   1) Pt to achieve <23% limitation as measured by the FOTO to demonstrate decreased disability.  (Progressing, not met)  2) The patient will increase strength in MMT of the left ankle to 5/5 in order to demonstrate improvements in functional strength for weight-bearing and gait. (Progressing, not met)  3) Th patient will improve SLS >30s (B) to improve stability and balance for ambulation.  (Progressing, not met)  4) Pt will ambulate 1 mile with (L) ankle pain <2/10 on level surfaces.  (Met)     Plan     Continue with LE strengthening, may discharge with HEP pending progress toward goals    Lul Muñoz, PT

## 2022-02-22 ENCOUNTER — CLINICAL SUPPORT (OUTPATIENT)
Dept: REHABILITATION | Facility: HOSPITAL | Age: 77
End: 2022-02-22
Attending: ORTHOPAEDIC SURGERY
Payer: MEDICARE

## 2022-02-22 DIAGNOSIS — M25.672 DECREASED RANGE OF MOTION OF LEFT ANKLE: Primary | ICD-10-CM

## 2022-02-22 DIAGNOSIS — R26.9 GAIT DIFFICULTY: ICD-10-CM

## 2022-02-22 PROCEDURE — 97110 THERAPEUTIC EXERCISES: CPT | Mod: PO

## 2022-02-22 PROCEDURE — 97140 MANUAL THERAPY 1/> REGIONS: CPT | Mod: PO

## 2022-02-23 NOTE — PROGRESS NOTES
Physical Therapy Daily Treatment Note     Name: Nigel Marcus Jr.  Clinic Number: [id number]    Therapy Diagnosis:   Encounter Diagnoses   Name Primary?    Decreased range of motion of left ankle Yes    Gait difficulty      Physician: Dennis Tripp MD    Visit Date: 2/25/2022    Physician Orders: PT Eval and Treat Left mid-subst achilles tendonitis   1x week x 3 weeks   No ASTYM NO DRY NEEDLING  Medical Diagnosis from Referral: Tendonitis, Achilles, left   Evaluation Date: 1/12/2022  Authorization Period Expiration: 12/31/2022  Plan of Care Expiration: 03/04/2022  Visit # / Visits authorized: 9/ 20    Time In: 1222  Time Out: 1310  Total Billable Time: 25 minutes    Precautions: Standard    Subjective     Pt reports: No pain. He reports he has been continuing with his HEP and heel raises  He was compliant with home exercise program.  Response to previous treatment: Improved pain   Functional change: Able to ambulate increased distance with less consistent pain    Pain: 0/10   Location: left ankles    Objective       ROM:  Ankle Left   Dorsiflexion 4 (NT) degrees   Plantarflexion 50 (NT) degrees   Inversion 32 (NT) degrees   Eversion 22 (NT) degrees     MMT: 5/5 grossly Left ankle    SL balance: ~20s (L)     Nigel received therapeutic exercises to develop strength, ROM and flexibility for 28 minutes including:  - Upright bike x 5 min  - Ankle 4D Black TB 3 x 10   - Seated heel raise 3 x 15 25#   - Standing Gastroc stretch x 2 min  - Standing heel raise 3 x 15 off 2 inch step 8#dbs     - Mini Squats 2 x 10    - Matrix hip extension 2 x 10 40#  - Matrix hip abduction 2 x 10 40#    Nigel received the following manual therapy techniques: Joint mobilizations were applied to the: (L) ankle for 12 minutes, including:  - Talocrural joint mob grade I-IV    Nigel participated in neuromuscular re-education activities to improve: Balance, Coordination and Proprioception for 6 minutes. The following activities were  included:  - Tandem stance 2 x 30s on foam  - SLS (L) 1 finger support, (R) no UE support 2 x 30s on ground    Home Exercises Provided and Patient Education Provided     Education provided:   - Continue with HEP    Written Home Exercises Provided: yes.  Exercises were reviewed and Nigel was able to demonstrate them prior to the end of the session.  Nigel demonstrated good  understanding of the education provided.     See EMR under Media for exercises provided prior visit.    Assessment     Pt has progressed very well with PT with good improvement with his strength, balance, and pain levels. He is performing his exercises regularly at home and understands importance of continued heel raises. He is independent with his HEP and has progressed very well toward all goals. Nigel is discharged from PT.    Nigel Is progressing well towards his goals.   Pt prognosis is Excellent.     Pt will continue to benefit from skilled outpatient physical therapy to address the deficits listed in the problem list box on initial evaluation, provide pt/family education and to maximize pt's level of independence in the home and community environment.     Pt's spiritual, cultural and educational needs considered and pt agreeable to plan of care and goals.    Anticipated barriers to physical therapy: None    Goals:  Short-Term Goals: 2 weeks  1) The patient will be independent and compliant with initial home exercise program as prescribed by physical therapist. (Met)  2) The patient will increase activity range of motion in the left ankle by 5 degrees dorsiflexion in order to restore normal mobility for gait. (Progressing, not met)    Long-Term Goals: 4 weeks   1) Pt to achieve <23% limitation as measured by the FOTO to demonstrate decreased disability.  (Progressing, not met)  2) The patient will increase strength in MMT of the left ankle to 5/5 in order to demonstrate improvements in functional strength for weight-bearing and gait.  (Met)  3) Th patient will improve SLS >30s (B) to improve stability and balance for ambulation. (Progressing, not met)  4) Pt will ambulate 1 mile with (L) ankle pain <2/10 on level surfaces.  (Met)     Plan     Discharge from PT    Lul Muñoz, PT

## 2022-02-25 ENCOUNTER — CLINICAL SUPPORT (OUTPATIENT)
Dept: REHABILITATION | Facility: HOSPITAL | Age: 77
End: 2022-02-25
Attending: ORTHOPAEDIC SURGERY
Payer: MEDICARE

## 2022-02-25 DIAGNOSIS — M25.672 DECREASED RANGE OF MOTION OF LEFT ANKLE: Primary | ICD-10-CM

## 2022-02-25 DIAGNOSIS — R26.9 GAIT DIFFICULTY: ICD-10-CM

## 2022-02-25 PROCEDURE — 97110 THERAPEUTIC EXERCISES: CPT | Mod: PO

## 2022-03-10 ENCOUNTER — PATIENT MESSAGE (OUTPATIENT)
Dept: RHEUMATOLOGY | Facility: CLINIC | Age: 77
End: 2022-03-10
Payer: MEDICARE

## 2022-04-21 ENCOUNTER — DOCUMENTATION ONLY (OUTPATIENT)
Dept: REHABILITATION | Facility: HOSPITAL | Age: 77
End: 2022-04-21
Payer: MEDICARE

## 2022-04-21 PROBLEM — M25.672 DECREASED RANGE OF MOTION OF LEFT ANKLE: Status: RESOLVED | Noted: 2022-01-12 | Resolved: 2022-04-21

## 2022-04-21 PROBLEM — R26.9 GAIT DIFFICULTY: Status: RESOLVED | Noted: 2022-01-12 | Resolved: 2022-04-21

## 2022-04-21 NOTE — PROGRESS NOTES
[facility]  Physical Therapy Discharge Note    Name: Nigel Marcus Jr.  Clinic Number: [id number]    Therapy Diagnosis:    Decreased range of motion of left ankle Yes    Gait difficulty        Physician: Dennis Tripp MD    Physician Orders: PT Eval and Treat Left mid-subst achilles tendonitis   1x week x 3 weeks   No ASTYM NO DRY NEEDLING  Medical Diagnosis from Referral: Tendonitis, Achilles, left   Evaluation Date: 1/12/2022      Date of Last visit: 02/25/2022  Total Visits Received: 10    ASSESSMENT        Discharge reason: Patient has not attended therapy since 02/25/2022 and Patient has reached the maximum rehab potential for the present time    Discharge FOTO Score: 38%    Goals:  Short-Term Goals: 2 weeks  1) The patient will be independent and compliant with initial home exercise program as prescribed by physical therapist. (Met)  2) The patient will increase activity range of motion in the left ankle by 5 degrees dorsiflexion in order to restore normal mobility for gait. (Progressing, not met)     Long-Term Goals: 4 weeks   1) Pt to achieve <23% limitation as measured by the FOTO to demonstrate decreased disability.  (Progressing, not met)  2) The patient will increase strength in MMT of the left ankle to 5/5 in order to demonstrate improvements in functional strength for weight-bearing and gait. (Met)  3) Th patient will improve SLS >30s (B) to improve stability and balance for ambulation. (Progressing, not met)  4) Pt will ambulate 1 mile with (L) ankle pain <2/10 on level surfaces.  (Met)     PLAN   This patient is discharged from physical therapy      Lul Muoñz, PT

## 2022-08-01 ENCOUNTER — TELEPHONE (OUTPATIENT)
Dept: RHEUMATOLOGY | Facility: CLINIC | Age: 77
End: 2022-08-01
Payer: MEDICARE

## 2022-08-01 NOTE — TELEPHONE ENCOUNTER
----- Message from Yessenia Arroyo MA sent at 7/29/2022 10:37 AM CDT -----  Contact: SELF    ----- Message -----  From: Don Ramirez  Sent: 7/29/2022  10:17 AM CDT  To: [facility] Rheumatology Clinical Support Staff    Type: Needs Medical Advice  Who Called: Patient  Best Call Back Number: [phone] (home landline)   Additional Information: Pt has est rheumatologist Currently sees Dr. Piña every 6 months. For a maintenance check up would like the same with new doctor. Has A mild case of Sjerners syndrome. Dr. Piña is retiring will see hi in November for last appt. Plz try to get pt in for 2023.  Pt only had appt twice a year wants to keep same checkups. Thanks.

## 2022-08-04 ENCOUNTER — OFFICE VISIT (OUTPATIENT)
Dept: RHEUMATOLOGY | Facility: CLINIC | Age: 77
End: 2022-08-04
Payer: MEDICARE

## 2022-08-04 VITALS — DIASTOLIC BLOOD PRESSURE: 83 MMHG | BODY MASS INDEX: 29.03 KG/M2 | SYSTOLIC BLOOD PRESSURE: 125 MMHG | WEIGHT: 220 LBS

## 2022-08-04 DIAGNOSIS — M35.00 SJOGREN'S SYNDROME, WITH UNSPECIFIED ORGAN INVOLVEMENT: Primary | ICD-10-CM

## 2022-08-04 DIAGNOSIS — Z79.899 ENCOUNTER FOR LONG-TERM (CURRENT) DRUG USE: ICD-10-CM

## 2022-08-04 PROCEDURE — 1160F RVW MEDS BY RX/DR IN RCRD: CPT | Mod: CPTII,S$GLB,, | Performed by: INTERNAL MEDICINE

## 2022-08-04 PROCEDURE — 3074F SYST BP LT 130 MM HG: CPT | Mod: CPTII,S$GLB,, | Performed by: INTERNAL MEDICINE

## 2022-08-04 PROCEDURE — 3288F FALL RISK ASSESSMENT DOCD: CPT | Mod: CPTII,S$GLB,, | Performed by: INTERNAL MEDICINE

## 2022-08-04 PROCEDURE — 1159F PR MEDICATION LIST DOCUMENTED IN MEDICAL RECORD: ICD-10-PCS | Mod: CPTII,S$GLB,, | Performed by: INTERNAL MEDICINE

## 2022-08-04 PROCEDURE — 1160F PR REVIEW ALL MEDS BY PRESCRIBER/CLIN PHARMACIST DOCUMENTED: ICD-10-PCS | Mod: CPTII,S$GLB,, | Performed by: INTERNAL MEDICINE

## 2022-08-04 PROCEDURE — 99213 OFFICE O/P EST LOW 20 MIN: CPT | Mod: S$GLB,,, | Performed by: INTERNAL MEDICINE

## 2022-08-04 PROCEDURE — 99213 PR OFFICE/OUTPT VISIT, EST, LEVL III, 20-29 MIN: ICD-10-PCS | Mod: S$GLB,,, | Performed by: INTERNAL MEDICINE

## 2022-08-04 PROCEDURE — 3079F DIAST BP 80-89 MM HG: CPT | Mod: CPTII,S$GLB,, | Performed by: INTERNAL MEDICINE

## 2022-08-04 PROCEDURE — 1125F AMNT PAIN NOTED PAIN PRSNT: CPT | Mod: CPTII,S$GLB,, | Performed by: INTERNAL MEDICINE

## 2022-08-04 PROCEDURE — 3008F PR BODY MASS INDEX (BMI) DOCUMENTED: ICD-10-PCS | Mod: CPTII,S$GLB,, | Performed by: INTERNAL MEDICINE

## 2022-08-04 PROCEDURE — 3079F PR MOST RECENT DIASTOLIC BLOOD PRESSURE 80-89 MM HG: ICD-10-PCS | Mod: CPTII,S$GLB,, | Performed by: INTERNAL MEDICINE

## 2022-08-04 PROCEDURE — 3288F PR FALLS RISK ASSESSMENT DOCUMENTED: ICD-10-PCS | Mod: CPTII,S$GLB,, | Performed by: INTERNAL MEDICINE

## 2022-08-04 PROCEDURE — 1101F PT FALLS ASSESS-DOCD LE1/YR: CPT | Mod: CPTII,S$GLB,, | Performed by: INTERNAL MEDICINE

## 2022-08-04 PROCEDURE — 3074F PR MOST RECENT SYSTOLIC BLOOD PRESSURE < 130 MM HG: ICD-10-PCS | Mod: CPTII,S$GLB,, | Performed by: INTERNAL MEDICINE

## 2022-08-04 PROCEDURE — 1159F MED LIST DOCD IN RCRD: CPT | Mod: CPTII,S$GLB,, | Performed by: INTERNAL MEDICINE

## 2022-08-04 PROCEDURE — 3008F BODY MASS INDEX DOCD: CPT | Mod: CPTII,S$GLB,, | Performed by: INTERNAL MEDICINE

## 2022-08-04 PROCEDURE — 1101F PR PT FALLS ASSESS DOC 0-1 FALLS W/OUT INJ PAST YR: ICD-10-PCS | Mod: CPTII,S$GLB,, | Performed by: INTERNAL MEDICINE

## 2022-08-04 PROCEDURE — 1125F PR PAIN SEVERITY QUANTIFIED, PAIN PRESENT: ICD-10-PCS | Mod: CPTII,S$GLB,, | Performed by: INTERNAL MEDICINE

## 2022-08-04 RX ORDER — CEVIMELINE HYDROCHLORIDE 30 MG/1
CAPSULE ORAL
Qty: 270 CAPSULE | Refills: 3 | Status: SHIPPED | OUTPATIENT
Start: 2022-08-04 | End: 2023-02-01 | Stop reason: SDUPTHER

## 2022-08-04 RX ORDER — HYDROXYCHLOROQUINE SULFATE 200 MG/1
200 TABLET, FILM COATED ORAL DAILY
Qty: 90 TABLET | Refills: 3 | Status: SHIPPED | OUTPATIENT
Start: 2022-08-04 | End: 2023-02-01 | Stop reason: SDUPTHER

## 2022-08-04 NOTE — PROGRESS NOTES
[facility] RHEUMATOLOGY           Follow-up visit    Notes dictated to M*Modal. Please forgive any unintended errors.  Subjective:       Patient ID:   NAME: Nigel Marcus Jr. : 1945     76 y.o. male    Referring Doc: No ref. provider found  Other Physicians:    Chief Complaint:  Sjogren's syndrome (Needs refill on Evoxac and Plaquenil)      HPI/Interval History:  The patient is doing great.  He has absolutely no complaints related to Sjogren's    ROS:   GEN:    no fever, night sweats or weight loss  SKIN:   no rashes, bruising, or swelling, no Raynauds, no photosensitivity  HEENT: no changes in vision, no mouth ulcers, no sicca symptoms, no scalp tenderness, no jaw claudication.  CV:      no CP, PND, CHAPARRO, orthopnea, no palpitations  PULM: no SOB, cough, hemoptysis, sputum or pleuritic pain  GI:        no dysphagia, no GERD, no hematemesis, no abdominal pain, nausea, vomiting, constipation, diarrhea, melanotic stools, hematochezia  :      no hematuria, dysuria  NEURO: no paresthesias, headaches, seizures  MUSCULOSKELETAL:  No red, hot, and/or swollen joints  PSYCH:   No increased insomnia, no increased anxiety, no increased depression    Past Medical/Surgical History:  Past Medical History:   Diagnosis Date    Dyslipidemia 2014    GERD (gastroesophageal reflux disease)     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 Vivek Zhu     HERNIORRHAPHY OF RECURRENT INGUINAL HERNIA Left 2020     Procedure: REPAIR, HERNIA, INGUINAL, RECURRENT;  Surgeon: Vivek Zhu MD;  Location: [facility] OR;  Service: General;  Laterality: Left;       Allergies:  Review of patient's allergies indicates:   Allergen Reactions    Sulfa (sulfonamide antibiotics) Other (See Comments)     Ringing in ears       Social/Family History:  Social History     Socioeconomic History    Marital status:    Tobacco Use    Smoking status: Never Smoker    Smokeless tobacco: Never Used   Substance and Sexual Activity    Alcohol use: Yes     Alcohol/week: 7.0 standard drinks     Types: 7 Shots of liquor per week    Drug use: No     Family History   Problem Relation Age of Onset    Cancer Father         prostate    Rheum arthritis Mother      FAMILY HISTORY: negative for Connective Tissue Disease      Medications:    Current Outpatient Medications:     aspirin (ECOTRIN) 81 MG EC tablet, Take 81 mg by mouth once daily., Disp: , Rfl:     cevimeline (EVOXAC) 30 mg capsule, TAKE 1 CAPSULE 2 TO 3 TIMESDAILY AS NEEDED FOR DRYNESS, Disp: 270 capsule, Rfl: 3    cholecalciferol, vitamin D3, (VITAMIN D3) 25 mcg (1,000 unit) capsule, Take 1,000 Units by mouth once daily., Disp: , Rfl:     cycloSPORINE (RESTASIS) 0.05 % ophthalmic emulsion, Place 1 drop into both eyes 2 (two) times daily., Disp: , Rfl:     doxepin (SINEQUAN) 10 MG capsule, Take 10 mg by mouth every evening., Disp: , Rfl:     hydrOXYchloroQUINE (PLAQUENIL) 200 mg tablet, Take 1 tablet (200 mg total) by mouth once daily., Disp: 90 tablet, Rfl: 3    hyoscyamine (LEVSIN/SL) 0.125 mg Subl, Place 0.125 mg under the tongue every 4 (four) hours as needed., Disp: , Rfl:     latanoprost (XALATAN) 0.005 % ophthalmic solution, Place 1 drop into both eyes every evening., Disp: , Rfl:     metoprolol succinate (TOPROL-XL) 25 MG 24 hr tablet, TAKE 1 TABLET ONCE DAILY, Disp: 90 tablet, Rfl: 1    mupirocin (BACTROBAN) 2 % ointment, 1 g by Nasal route 2 (two) times daily., Disp: , Rfl:      omega-3 fatty acids 500 mg Cap, Take by mouth., Disp: , Rfl:     pantoprazole (PROTONIX) 40 MG tablet, Take 40 mg by mouth once daily. , Disp: , Rfl: 0    VOLTAREN 1 % Gel, Apply 2 g topically 2 (two) times daily as needed. , Disp: , Rfl: 3    zinc gluconate 50 mg tablet, Take 50 mg by mouth once daily., Disp: , Rfl:   No current facility-administered medications for this visit.    Facility-Administered Medications Ordered in Other Visits:     lactated ringers infusion, , Intravenous, Continuous, Tin Jacob MD    LIDOcaine (PF) 10 mg/ml (1%) injection 10 mg, 1 mL, Other, Once, Tin Jacob MD      Objective:     Vitals:  Weight 99.8 kg (220 lb).    Physical Examination:   GEN: wn/wd male in no apparent distress  SKIN: no rashes, no sclerodactyly, no Raynaud's, no periungual erythema, no digital tip ulcerations, no nailbed pitting  HEAD: no alopecia, no scalp tenderness, no temporal artery tenderness or induration.  EYES: no pallor, no icterus, PERRLA  ENT:  no thrush, no mucosal dryness or ulcerations, adequate oral hygiene & dentition.  NECK: supple x 6, no masses, no thyromegaly, no lymphadenopathy.  CV:   S1 and S2 regular, no murmurs, gallop or rubs  CHEST: Normal respiratory effort;  normal breath sounds/no adventitious sounds. No signs of consolidation.  ABD: non-tender and non-distended; soft; normal bowel sounds; no rebound/guarding or tenderness. No hepatosplenomegaly.  Musculoskeletal:  No evidence of inflammatory arthritis  EXTREM: no clubbing, cyanosis or edema. normal pulses.  NEURO:  grossly intact; motor/sensory WNL; no tremors  PSYCH:  normal mood, affect and behavior    Labs:   Lab Results   Component Value Date    WBC 6.30 07/26/2022    HGB 14.8 07/26/2022    HCT 42.4 07/26/2022    MCV 99 (H) 07/26/2022     07/26/2022   CMP@  Sodium   Date Value Ref Range Status   07/26/2022 138 136 - 145 mmol/L Final   03/11/2019 139 134 - 144 mmol/L      Potassium   Date Value Ref  Range Status   07/26/2022 4.1 3.5 - 5.1 mmol/L Final   08/31/2015 4.3 3.5 - 5.1 MMOL/L Final     Chloride   Date Value Ref Range Status   07/26/2022 105 95 - 110 mmol/L Final   03/11/2019 104 98 - 110 mmol/L      CO2   Date Value Ref Range Status   07/26/2022 28 22 - 31 mmol/L Final     Glucose   Date Value Ref Range Status   07/26/2022 85 70 - 110 mg/dL Final     Comment:     The ADA recommends the following guidelines for fasting glucose:    Normal:       less than 100 mg/dL    Prediabetes:  100 mg/dL to 125 mg/dL    Diabetes:     126 mg/dL or higher     03/11/2019 94 70 - 99 mg/dL      BUN   Date Value Ref Range Status   07/26/2022 17 9 - 21 mg/dL Final     Creatinine   Date Value Ref Range Status   07/26/2022 0.82 0.50 - 1.40 mg/dL Final   07/22/2019 0.91 0.60 - 1.40 mg/dL      Calcium   Date Value Ref Range Status   07/26/2022 8.7 8.4 - 10.2 mg/dL Final     Total Protein   Date Value Ref Range Status   07/26/2022 6.5 6.0 - 8.4 g/dL Final     Albumin   Date Value Ref Range Status   07/26/2022 3.7 3.5 - 5.2 g/dL Final   03/11/2019 3.9 3.1 - 4.7 g/dL      Total Bilirubin   Date Value Ref Range Status   07/26/2022 0.8 0.2 - 1.3 mg/dL Final     Alkaline Phosphatase   Date Value Ref Range Status   07/26/2022 95 38 - 145 U/L Final     AST   Date Value Ref Range Status   07/26/2022 35 17 - 59 U/L Final     ALT   Date Value Ref Range Status   07/26/2022 17 0 - 50 U/L Final     CRP   Date Value Ref Range Status   07/26/2022 <0.50 0.00 - 0.90 mg/dL Final       Radiology/Diagnostic Studies:    None    Assessment/Discussion/Plan:   76 y.o. male with Sjogren's-good control on Plaquenil 200 mg daily and Evoxac 30 mg 2-3 times daily as needed    PLAN:  I will continue his medication without change.  Routine labs were ordered and should be performed 1 month prior to his upcoming visit with his new rheumatologist.    RTC:  I will remain available to him throughout the remainder of this year        Electronically signed by  Nic Piña MD      Patient notified that this office will be closing December 22, 2022. They should begin looking for another rheumatologist as soon as possible.  A list with the names and contact details of rheumatologists in the surrounding area was provided.

## 2023-01-30 NOTE — PROGRESS NOTES
"Subjective:       Patient ID: Nigel Marcus Jr. is a 77 y.o. male.    Chief Complaint: Disease Management and Sjogren's syndrome    HPI    This is a 77-year-old man with history of inguinal hernia, sulfa allergy (tinnitus to Bactrim), PVCs, rare esophageal spasms, DDD, mild tinnitus, and Sjogren's syndrome diagnosed in 2005 on Restasis, Evoxac (2-3 times per day) and Plaquenil 200mg daily ( 2005- ) who was last seen by Dr Piña in 8/2022 and was doing well at that time. His sicca symptoms are very well-controlled on his current regimen. He denies fevers, night sweats, unintentional weight loss.    Objective:   /82   Pulse 77   Ht 6' 1" (1.854 m)   Wt 103.1 kg (227 lb 6.5 oz)   BMI 30.00 kg/m²      Physical Exam   Constitutional: normal appearance.   HENT:   Head: Normocephalic and atraumatic.   Mouth/Throat: Mucous membranes are moist.   Cardiovascular: Normal rate, regular rhythm and normal heart sounds.   Pulmonary/Chest: Effort normal and breath sounds normal.   Musculoskeletal:      Comments: No synovitis, dactylitis, enthesitis, effusions     Neurological: He is alert.   Skin: Skin is warm and dry.   No skin thickening, telangiectasias, calcinosis, psoriasiform lesions, lupoid lesions        No data to display     Assessment:       1. Sjogren's syndrome, with unspecified organ involvement    2. Encounter for long-term (current) drug use    3. Long-term use of Plaquenil        This is a 77-year-old man with history of inguinal hernia, sulfa allergy (tinnitus to Bactrim), PVCs, rare esophageal spasms, DDD, mild tinnitus, and Sjogren's syndrome diagnosed in 2005 on Restasis, Evoxac (2-3 times per day) and Plaquenil 200mg daily ( 2005- ) who was last seen by Dr Piña in 8/2022 and was doing well at that time. His sicca symptoms are very well-controlled on his current regimen. He denies fevers, night sweats, unintentional weight loss. Physical examination is unremarkable.    Plan:       Problem List " Items Addressed This Visit          Immunology/Multi System    Gougerout-Sjoegren syndrome - Primary    Relevant Medications    cevimeline (EVOXAC) 30 mg capsule    hydrOXYchloroQUINE (PLAQUENIL) 200 mg tablet    Other Relevant Orders    CBC Auto Differential    Comprehensive Metabolic Panel    Sedimentation rate    C-Reactive Protein    Sjogrens syndrome-A extractable nuclear antibody     Other Visit Diagnoses       Encounter for long-term (current) drug use        Relevant Orders    CBC Auto Differential    Comprehensive Metabolic Panel    Sedimentation rate    C-Reactive Protein    Sjogrens syndrome-A extractable nuclear antibody    Long-term use of Plaquenil              - Labs today  - Continue HCQ 200mg OD  - Continue Evoxac 2-3 times per day  - Patient states that he gets HCQ eye exams twice a year (Dr Nascimento), last visit October 2022  - Regular dental visits    Follow up in 6 months    45 minutes of total time spent on the encounter, which includes face to face time and non-face to face time preparing to see the patient (eg, review of tests), Obtaining and/or reviewing separately obtained history, Documenting clinical information in the electronic or other health record, Independently interpreting results (not separately reported) and communicating results to the patient/family/caregiver, or Care coordination (not separately reported).       Reema Singh M.D.  Rheumatology Dept  [place], [place]

## 2023-02-01 ENCOUNTER — OFFICE VISIT (OUTPATIENT)
Dept: RHEUMATOLOGY | Facility: CLINIC | Age: 78
End: 2023-02-01
Payer: MEDICARE

## 2023-02-01 VITALS
HEART RATE: 77 BPM | WEIGHT: 227.38 LBS | HEIGHT: 73 IN | BODY MASS INDEX: 30.14 KG/M2 | DIASTOLIC BLOOD PRESSURE: 82 MMHG | SYSTOLIC BLOOD PRESSURE: 138 MMHG

## 2023-02-01 DIAGNOSIS — Z79.899 LONG-TERM USE OF PLAQUENIL: ICD-10-CM

## 2023-02-01 DIAGNOSIS — M35.00 SJOGREN'S SYNDROME, WITH UNSPECIFIED ORGAN INVOLVEMENT: Primary | ICD-10-CM

## 2023-02-01 DIAGNOSIS — Z79.899 ENCOUNTER FOR LONG-TERM (CURRENT) DRUG USE: ICD-10-CM

## 2023-02-01 PROCEDURE — 99999 PR PBB SHADOW E&M-EST. PATIENT-LVL III: ICD-10-PCS | Mod: PBBFAC,,, | Performed by: STUDENT IN AN ORGANIZED HEALTH CARE EDUCATION/TRAINING PROGRAM

## 2023-02-01 PROCEDURE — 1159F PR MEDICATION LIST DOCUMENTED IN MEDICAL RECORD: ICD-10-PCS | Mod: CPTII,S$GLB,, | Performed by: STUDENT IN AN ORGANIZED HEALTH CARE EDUCATION/TRAINING PROGRAM

## 2023-02-01 PROCEDURE — 1126F PR PAIN SEVERITY QUANTIFIED, NO PAIN PRESENT: ICD-10-PCS | Mod: CPTII,S$GLB,, | Performed by: STUDENT IN AN ORGANIZED HEALTH CARE EDUCATION/TRAINING PROGRAM

## 2023-02-01 PROCEDURE — 3079F DIAST BP 80-89 MM HG: CPT | Mod: CPTII,S$GLB,, | Performed by: STUDENT IN AN ORGANIZED HEALTH CARE EDUCATION/TRAINING PROGRAM

## 2023-02-01 PROCEDURE — 99204 PR OFFICE/OUTPT VISIT, NEW, LEVL IV, 45-59 MIN: ICD-10-PCS | Mod: S$GLB,,, | Performed by: STUDENT IN AN ORGANIZED HEALTH CARE EDUCATION/TRAINING PROGRAM

## 2023-02-01 PROCEDURE — 1101F PT FALLS ASSESS-DOCD LE1/YR: CPT | Mod: CPTII,S$GLB,, | Performed by: STUDENT IN AN ORGANIZED HEALTH CARE EDUCATION/TRAINING PROGRAM

## 2023-02-01 PROCEDURE — 1126F AMNT PAIN NOTED NONE PRSNT: CPT | Mod: CPTII,S$GLB,, | Performed by: STUDENT IN AN ORGANIZED HEALTH CARE EDUCATION/TRAINING PROGRAM

## 2023-02-01 PROCEDURE — 99204 OFFICE O/P NEW MOD 45 MIN: CPT | Mod: S$GLB,,, | Performed by: STUDENT IN AN ORGANIZED HEALTH CARE EDUCATION/TRAINING PROGRAM

## 2023-02-01 PROCEDURE — 1159F MED LIST DOCD IN RCRD: CPT | Mod: CPTII,S$GLB,, | Performed by: STUDENT IN AN ORGANIZED HEALTH CARE EDUCATION/TRAINING PROGRAM

## 2023-02-01 PROCEDURE — 3288F FALL RISK ASSESSMENT DOCD: CPT | Mod: CPTII,S$GLB,, | Performed by: STUDENT IN AN ORGANIZED HEALTH CARE EDUCATION/TRAINING PROGRAM

## 2023-02-01 PROCEDURE — 1101F PR PT FALLS ASSESS DOC 0-1 FALLS W/OUT INJ PAST YR: ICD-10-PCS | Mod: CPTII,S$GLB,, | Performed by: STUDENT IN AN ORGANIZED HEALTH CARE EDUCATION/TRAINING PROGRAM

## 2023-02-01 PROCEDURE — 99999 PR PBB SHADOW E&M-EST. PATIENT-LVL III: CPT | Mod: PBBFAC,,, | Performed by: STUDENT IN AN ORGANIZED HEALTH CARE EDUCATION/TRAINING PROGRAM

## 2023-02-01 PROCEDURE — 3075F SYST BP GE 130 - 139MM HG: CPT | Mod: CPTII,S$GLB,, | Performed by: STUDENT IN AN ORGANIZED HEALTH CARE EDUCATION/TRAINING PROGRAM

## 2023-02-01 PROCEDURE — 3079F PR MOST RECENT DIASTOLIC BLOOD PRESSURE 80-89 MM HG: ICD-10-PCS | Mod: CPTII,S$GLB,, | Performed by: STUDENT IN AN ORGANIZED HEALTH CARE EDUCATION/TRAINING PROGRAM

## 2023-02-01 PROCEDURE — 3288F PR FALLS RISK ASSESSMENT DOCUMENTED: ICD-10-PCS | Mod: CPTII,S$GLB,, | Performed by: STUDENT IN AN ORGANIZED HEALTH CARE EDUCATION/TRAINING PROGRAM

## 2023-02-01 PROCEDURE — 3075F PR MOST RECENT SYSTOLIC BLOOD PRESS GE 130-139MM HG: ICD-10-PCS | Mod: CPTII,S$GLB,, | Performed by: STUDENT IN AN ORGANIZED HEALTH CARE EDUCATION/TRAINING PROGRAM

## 2023-02-01 RX ORDER — HYDROXYCHLOROQUINE SULFATE 200 MG/1
200 TABLET, FILM COATED ORAL DAILY
Qty: 90 TABLET | Refills: 3 | Status: SHIPPED | OUTPATIENT
Start: 2023-02-01 | End: 2023-08-01

## 2023-02-01 RX ORDER — CEVIMELINE HYDROCHLORIDE 30 MG/1
CAPSULE ORAL
Qty: 270 CAPSULE | Refills: 3 | Status: SHIPPED | OUTPATIENT
Start: 2023-02-01 | End: 2023-02-28 | Stop reason: SDUPTHER

## 2023-02-27 ENCOUNTER — TELEPHONE (OUTPATIENT)
Dept: RHEUMATOLOGY | Facility: CLINIC | Age: 78
End: 2023-02-27
Payer: MEDICARE

## 2023-02-27 NOTE — TELEPHONE ENCOUNTER
----- Message from Abena Montero sent at 2/27/2023 12:05 PM CST -----  Contact: self  Type: Needs Medical Advice  Who Called:  Patient    Best Call Back Number: [phone]      Additional Information: Pt states he need to speak with someone in the office about the pharmacy and medication cevimeline (EVOXAC) 30 mg capsule. Please call back

## 2023-02-27 NOTE — TELEPHONE ENCOUNTER
Patient states his mail order pharmacy sent message that Evoxac is out of stock and wants to send an alternative. Nurse will call [facility] and follow up with when medication will be available as patient does not want to change medication.

## 2023-02-28 ENCOUNTER — PATIENT MESSAGE (OUTPATIENT)
Dept: RHEUMATOLOGY | Facility: CLINIC | Age: 78
End: 2023-02-28
Payer: MEDICARE

## 2023-02-28 ENCOUNTER — TELEPHONE (OUTPATIENT)
Dept: RHEUMATOLOGY | Facility: CLINIC | Age: 78
End: 2023-02-28
Payer: MEDICARE

## 2023-02-28 DIAGNOSIS — M35.00 SJOGREN'S SYNDROME, WITH UNSPECIFIED ORGAN INVOLVEMENT: ICD-10-CM

## 2023-02-28 RX ORDER — CEVIMELINE HYDROCHLORIDE 30 MG/1
CAPSULE ORAL
Qty: 270 CAPSULE | Refills: 3 | Status: SHIPPED | OUTPATIENT
Start: 2023-02-28

## 2023-02-28 NOTE — TELEPHONE ENCOUNTER
[facility] states the Evoxac is out of stack long term.   Spoke to Mr Amrit and advised him to decide if he is willing to change to [place] or reach out to local pharmacies to see if they have Evoxac stock pt will call back after speaking to his pharmacy and local pharmacies.

## 2023-03-17 ENCOUNTER — PATIENT MESSAGE (OUTPATIENT)
Dept: RESEARCH | Facility: HOSPITAL | Age: 78
End: 2023-03-17
Payer: MEDICARE

## 2023-04-20 ENCOUNTER — TELEPHONE (OUTPATIENT)
Dept: RHEUMATOLOGY | Facility: CLINIC | Age: 78
End: 2023-04-20
Payer: MEDICARE

## 2023-04-20 NOTE — TELEPHONE ENCOUNTER
Records received and reviewed:  Ophthalmology (04/17/2023) Plaquenil eye exam completed.  Cleared to continue Plaquenil.

## 2023-07-31 NOTE — PROGRESS NOTES
"Subjective:      Patient ID: Nigel Marcus Jr. is a 77 y.o. male.    Chief Complaint: Disease Management    HPI    Rheumatologic History:     - Diagnosis/es:   - Sjogren's syndrome diagnosed in 2005  - Positive serologies: -  - Negative serologies: SSA  - Infectious screening labs: -  - Imaging: -  - Previous Treatments: -  - Current Treatments:   - Restasis  - Evoxac (2-3 times per day)  - Plaquenil 200mg daily ( 2005- )   Interval History:   He was last seen in Feb 2023 and was doing well at that time. Currently, his dry eyes and mouth are stable. He denies night sweats, fevers, and unintentional weight loss.     Objective:   /62   Pulse 86   Ht 6' 1" (1.854 m)   Wt 94.8 kg (209 lb)   BMI 27.57 kg/m²   Physical Exam   Constitutional: normal appearance.   HENT:   Head: Normocephalic and atraumatic.   Head: No cervical LAD  Cardiovascular: Normal rate, regular rhythm and normal heart sounds.   Pulmonary/Chest: Effort normal and breath sounds normal.   Musculoskeletal:      Comments: No synovitis, dactylitis, enthesitis, effusions       Neurological: He is alert.   Skin: Skin is warm and dry. No rash noted.   No skin thickening, telangiectasias, calcinosis, psoriasiform lesions, lupoid lesions       No data to display     Assessment:     1. Sjogren's syndrome, with unspecified organ involvement    2. Encounter for long-term (current) drug use    3. Long-term use of Plaquenil      This is a 77-year-old man with history of inguinal hernia, sulfa allergy (tinnitus to Bactrim), PVCs, rare esophageal spasms, DDD, mild tinnitus, and Sjogren's syndrome diagnosed in 2005 on Restasis, Evoxac (2-3 times per day) and Plaquenil 200mg daily ( 2005- ). Will have him try reducing Plaquenil to every other day.     Plan:     Problem List Items Addressed This Visit          Immunology/Multi System    Gougerout-Sjoegren syndrome - Primary     Other Visit Diagnoses       Encounter for long-term (current) drug use        " Long-term use of Plaquenil              - Labs to be done in 9/2023 (ordered by PCP per patient)  - Continue HCQ 200mg EOD  - Continue Evoxac 2-3 times per day  - Patient states that he gets HCQ eye exams twice a year (Dr Nascimento), last visit October 2022    Follow up in 6 months    30 minutes of total time spent on the encounter, which includes face to face time and non-face to face time preparing to see the patient (eg, review of tests), Obtaining and/or reviewing separately obtained history, Documenting clinical information in the electronic or other health record, Independently interpreting results (not separately reported) and communicating results to the patient/family/caregiver, or Care coordination (not separately reported).       Reema Singh M.D.  Rheumatology Dept  [place], [place]

## 2023-08-01 ENCOUNTER — OFFICE VISIT (OUTPATIENT)
Dept: RHEUMATOLOGY | Facility: CLINIC | Age: 78
End: 2023-08-01
Payer: MEDICARE

## 2023-08-01 VITALS
DIASTOLIC BLOOD PRESSURE: 62 MMHG | HEART RATE: 86 BPM | BODY MASS INDEX: 27.7 KG/M2 | SYSTOLIC BLOOD PRESSURE: 130 MMHG | HEIGHT: 73 IN | WEIGHT: 209 LBS

## 2023-08-01 DIAGNOSIS — Z79.899 ENCOUNTER FOR LONG-TERM (CURRENT) DRUG USE: ICD-10-CM

## 2023-08-01 DIAGNOSIS — Z79.899 LONG-TERM USE OF PLAQUENIL: ICD-10-CM

## 2023-08-01 DIAGNOSIS — M35.00 SJOGREN'S SYNDROME, WITH UNSPECIFIED ORGAN INVOLVEMENT: Primary | ICD-10-CM

## 2023-08-01 PROCEDURE — 3075F PR MOST RECENT SYSTOLIC BLOOD PRESS GE 130-139MM HG: ICD-10-PCS | Mod: CPTII,S$GLB,, | Performed by: STUDENT IN AN ORGANIZED HEALTH CARE EDUCATION/TRAINING PROGRAM

## 2023-08-01 PROCEDURE — 3078F DIAST BP <80 MM HG: CPT | Mod: CPTII,S$GLB,, | Performed by: STUDENT IN AN ORGANIZED HEALTH CARE EDUCATION/TRAINING PROGRAM

## 2023-08-01 PROCEDURE — 3075F SYST BP GE 130 - 139MM HG: CPT | Mod: CPTII,S$GLB,, | Performed by: STUDENT IN AN ORGANIZED HEALTH CARE EDUCATION/TRAINING PROGRAM

## 2023-08-01 PROCEDURE — 1160F PR REVIEW ALL MEDS BY PRESCRIBER/CLIN PHARMACIST DOCUMENTED: ICD-10-PCS | Mod: CPTII,S$GLB,, | Performed by: STUDENT IN AN ORGANIZED HEALTH CARE EDUCATION/TRAINING PROGRAM

## 2023-08-01 PROCEDURE — 1159F MED LIST DOCD IN RCRD: CPT | Mod: CPTII,S$GLB,, | Performed by: STUDENT IN AN ORGANIZED HEALTH CARE EDUCATION/TRAINING PROGRAM

## 2023-08-01 PROCEDURE — 99999 PR PBB SHADOW E&M-EST. PATIENT-LVL III: CPT | Mod: PBBFAC,,, | Performed by: STUDENT IN AN ORGANIZED HEALTH CARE EDUCATION/TRAINING PROGRAM

## 2023-08-01 PROCEDURE — 3078F PR MOST RECENT DIASTOLIC BLOOD PRESSURE < 80 MM HG: ICD-10-PCS | Mod: CPTII,S$GLB,, | Performed by: STUDENT IN AN ORGANIZED HEALTH CARE EDUCATION/TRAINING PROGRAM

## 2023-08-01 PROCEDURE — 1126F AMNT PAIN NOTED NONE PRSNT: CPT | Mod: CPTII,S$GLB,, | Performed by: STUDENT IN AN ORGANIZED HEALTH CARE EDUCATION/TRAINING PROGRAM

## 2023-08-01 PROCEDURE — 99999 PR PBB SHADOW E&M-EST. PATIENT-LVL III: ICD-10-PCS | Mod: PBBFAC,,, | Performed by: STUDENT IN AN ORGANIZED HEALTH CARE EDUCATION/TRAINING PROGRAM

## 2023-08-01 PROCEDURE — 99214 PR OFFICE/OUTPT VISIT, EST, LEVL IV, 30-39 MIN: ICD-10-PCS | Mod: S$GLB,,, | Performed by: STUDENT IN AN ORGANIZED HEALTH CARE EDUCATION/TRAINING PROGRAM

## 2023-08-01 PROCEDURE — 99214 OFFICE O/P EST MOD 30 MIN: CPT | Mod: S$GLB,,, | Performed by: STUDENT IN AN ORGANIZED HEALTH CARE EDUCATION/TRAINING PROGRAM

## 2023-08-01 PROCEDURE — 1126F PR PAIN SEVERITY QUANTIFIED, NO PAIN PRESENT: ICD-10-PCS | Mod: CPTII,S$GLB,, | Performed by: STUDENT IN AN ORGANIZED HEALTH CARE EDUCATION/TRAINING PROGRAM

## 2023-08-01 PROCEDURE — 1159F PR MEDICATION LIST DOCUMENTED IN MEDICAL RECORD: ICD-10-PCS | Mod: CPTII,S$GLB,, | Performed by: STUDENT IN AN ORGANIZED HEALTH CARE EDUCATION/TRAINING PROGRAM

## 2023-08-01 PROCEDURE — 1160F RVW MEDS BY RX/DR IN RCRD: CPT | Mod: CPTII,S$GLB,, | Performed by: STUDENT IN AN ORGANIZED HEALTH CARE EDUCATION/TRAINING PROGRAM

## 2023-08-01 RX ORDER — HYDROXYCHLOROQUINE SULFATE 200 MG/1
200 TABLET, FILM COATED ORAL EVERY OTHER DAY
Qty: 90 TABLET | Refills: 3 | Status: SHIPPED | OUTPATIENT
Start: 2023-08-01 | End: 2023-12-27 | Stop reason: SDUPTHER

## 2023-08-01 ASSESSMENT — ROUTINE ASSESSMENT OF PATIENT INDEX DATA (RAPID3)
PATIENT GLOBAL ASSESSMENT SCORE: 0
PAIN SCORE: 0
TOTAL RAPID3 SCORE: 0
PSYCHOLOGICAL DISTRESS SCORE: 0
MDHAQ FUNCTION SCORE: 0
FATIGUE SCORE: 0

## 2023-12-27 ENCOUNTER — PATIENT MESSAGE (OUTPATIENT)
Dept: RHEUMATOLOGY | Facility: CLINIC | Age: 78
End: 2023-12-27
Payer: MEDICARE

## 2023-12-27 DIAGNOSIS — M35.00 SJOGREN'S SYNDROME, WITH UNSPECIFIED ORGAN INVOLVEMENT: ICD-10-CM

## 2023-12-28 RX ORDER — HYDROXYCHLOROQUINE SULFATE 200 MG/1
200 TABLET, FILM COATED ORAL EVERY OTHER DAY
Qty: 90 TABLET | Refills: 3 | Status: SHIPPED | OUTPATIENT
Start: 2023-12-28 | End: 2024-03-04

## 2024-03-01 NOTE — PROGRESS NOTES
Subjective:      Patient ID: Nigel Marcus Jr. is a 78 y.o. male.    Chief Complaint: Disease Management    HPI    Rheumatologic History:      - Diagnosis/es:              - Sjogren's syndrome diagnosed in 2005  - Positive serologies: -  - Negative serologies: SSA  - Infectious screening labs: -  - Imaging: -  - Previous Treatments:    - Plaquenil 200mg daily ( 2005-2/2024)   - Current Treatments:   - Restasis  - Evoxac (2-3 times per day)    Interval History:   He has not noticed any worsening in his symptoms since decreasing Plaquenil to every other day.  His sicca symptoms are well-controlled.  He denies fevers, night sweats, and an unintentional weight loss.    Objective:   /88   Pulse 82   Ht 6' (1.829 m)   Wt 92 kg (202 lb 13.2 oz)   BMI 27.51 kg/m²   Physical Exam   Constitutional: normal appearance.   HENT:   Head: Normocephalic and atraumatic.   Cardiovascular: Normal rate, regular rhythm and normal heart sounds.   Pulmonary/Chest: Effort normal and breath sounds normal.   Musculoskeletal:      Comments: No synovitis, dactylitis, enthesitis, effusions     Neurological: He is alert.   Skin: Skin is warm and dry. No rash noted.   No skin thickening, telangiectasias, calcinosis, psoriasiform lesions, lupoid lesions         No data to display     Assessment:     1. Sjogren's syndrome, with unspecified organ involvement      This is a 77-year-old man with history of inguinal hernia, sulfa allergy (tinnitus to Bactrim), PVCs, rare esophageal spasms, DDD, mild tinnitus, and Sjogren's syndrome diagnosed in 2005 on Restasis, Evoxac (2-3 times per day) and Plaquenil 200mg every other day (2005- ). He has not noticed any worsening in his symptoms since decreasing Plaquenil to every other day.  His sicca symptoms are well-controlled.  He denies fevers, night sweats, and an unintentional weight loss. Discontinue Plaquenil and monitor off DMARD therapy.     Plan:     Problem List Items Addressed This Visit           Immunology/Multi System    Gougerout-Sjoegren syndrome - Primary     - Discontinue Plaquenil and monitor off DMARD therapy  - Continue Evoxac 2-3 times per day  - Patient states that he gets HCQ eye exams twice a year (Dr Nascimento), last visit October 2022    Follow up in 6 months    30 minutes of total time spent on the encounter, which includes face to face time and non-face to face time preparing to see the patient (eg, review of tests), Obtaining and/or reviewing separately obtained history, Documenting clinical information in the electronic or other health record, Independently interpreting results (not separately reported) and communicating results to the patient/family/caregiver, or Care coordination (not separately reported).     This note was prepared with [employer] Direct voice recognition transcription software. Garbled syntax, mangled pronouns, and other bizarre constructions may be attributed to that software system       Reema Singh M.D.  Rheumatology Dept  [place], [place]

## 2024-03-04 ENCOUNTER — OFFICE VISIT (OUTPATIENT)
Dept: RHEUMATOLOGY | Facility: CLINIC | Age: 79
End: 2024-03-04
Payer: MEDICARE

## 2024-03-04 VITALS
BODY MASS INDEX: 27.47 KG/M2 | DIASTOLIC BLOOD PRESSURE: 88 MMHG | HEART RATE: 82 BPM | HEIGHT: 72 IN | WEIGHT: 202.81 LBS | SYSTOLIC BLOOD PRESSURE: 128 MMHG

## 2024-03-04 DIAGNOSIS — M35.00 SJOGREN'S SYNDROME, WITH UNSPECIFIED ORGAN INVOLVEMENT: Primary | ICD-10-CM

## 2024-03-04 PROCEDURE — 3074F SYST BP LT 130 MM HG: CPT | Mod: CPTII,S$GLB,, | Performed by: STUDENT IN AN ORGANIZED HEALTH CARE EDUCATION/TRAINING PROGRAM

## 2024-03-04 PROCEDURE — 3079F DIAST BP 80-89 MM HG: CPT | Mod: CPTII,S$GLB,, | Performed by: STUDENT IN AN ORGANIZED HEALTH CARE EDUCATION/TRAINING PROGRAM

## 2024-03-04 PROCEDURE — 99999 PR PBB SHADOW E&M-EST. PATIENT-LVL III: CPT | Mod: PBBFAC,,, | Performed by: STUDENT IN AN ORGANIZED HEALTH CARE EDUCATION/TRAINING PROGRAM

## 2024-03-04 PROCEDURE — 1101F PT FALLS ASSESS-DOCD LE1/YR: CPT | Mod: CPTII,S$GLB,, | Performed by: STUDENT IN AN ORGANIZED HEALTH CARE EDUCATION/TRAINING PROGRAM

## 2024-03-04 PROCEDURE — 1126F AMNT PAIN NOTED NONE PRSNT: CPT | Mod: CPTII,S$GLB,, | Performed by: STUDENT IN AN ORGANIZED HEALTH CARE EDUCATION/TRAINING PROGRAM

## 2024-03-04 PROCEDURE — 1159F MED LIST DOCD IN RCRD: CPT | Mod: CPTII,S$GLB,, | Performed by: STUDENT IN AN ORGANIZED HEALTH CARE EDUCATION/TRAINING PROGRAM

## 2024-03-04 PROCEDURE — 99213 OFFICE O/P EST LOW 20 MIN: CPT | Mod: S$GLB,,, | Performed by: STUDENT IN AN ORGANIZED HEALTH CARE EDUCATION/TRAINING PROGRAM

## 2024-03-04 PROCEDURE — 3288F FALL RISK ASSESSMENT DOCD: CPT | Mod: CPTII,S$GLB,, | Performed by: STUDENT IN AN ORGANIZED HEALTH CARE EDUCATION/TRAINING PROGRAM

## 2024-03-04 PROCEDURE — 1160F RVW MEDS BY RX/DR IN RCRD: CPT | Mod: CPTII,S$GLB,, | Performed by: STUDENT IN AN ORGANIZED HEALTH CARE EDUCATION/TRAINING PROGRAM

## 2024-03-04 ASSESSMENT — ROUTINE ASSESSMENT OF PATIENT INDEX DATA (RAPID3)
PSYCHOLOGICAL DISTRESS SCORE: 0
FATIGUE SCORE: 0
MDHAQ FUNCTION SCORE: 0
PATIENT GLOBAL ASSESSMENT SCORE: 0
TOTAL RAPID3 SCORE: 0
PAIN SCORE: 0

## 2024-05-07 ENCOUNTER — HOSPITAL ENCOUNTER (OUTPATIENT)
Dept: RADIOLOGY | Facility: HOSPITAL | Age: 79
Discharge: HOME | End: 2024-05-07
Attending: UROLOGY
Payer: MEDICARE

## 2024-05-07 DIAGNOSIS — R10.10 PAIN OF UPPER ABDOMEN: ICD-10-CM

## 2024-05-07 PROCEDURE — 74176 CT ABD & PELVIS W/O CONTRAST: CPT | Mod: 26,,, | Performed by: RADIOLOGY

## 2024-05-07 PROCEDURE — 74176 CT ABD & PELVIS W/O CONTRAST: CPT | Mod: TC

## 2024-09-03 NOTE — PROGRESS NOTES
Subjective:      Patient ID: Nigel Marcus Jr. is a 78 y.o. male.    Chief Complaint: Disease Management    HPI    Rheumatologic History:      - Diagnosis/es:              - Sjogren's syndrome diagnosed in 2005  - Positive serologies: -  - Negative serologies: SSA  - Infectious screening labs: -  - Imaging: -  - Previous Treatments: -  - Current Treatments:   - Restasis  - Evoxac (2-3 times per day)  - Plaquenil 200mg every other day (2005- ) (patient tried discontinuing Plaquenil but developed worsening sicca symptoms)  Interval History:   Patient tried discontinuing Plaquenil but developed worsening sicca symptoms. He is now back on Plaquenil and denies fevers, night sweats, unintentional weight loss, lymphadenopathy. His sicca symptoms are well controlled.     Objective:   /81   Pulse 70   Wt 91.9 kg (202 lb 9.6 oz)   BMI 27.48 kg/m²   Physical Exam   Constitutional: normal appearance.   HENT:   Head: Normocephalic and atraumatic.   Cardiovascular: Normal rate, regular rhythm and normal heart sounds.   Pulmonary/Chest: Effort normal and breath sounds normal.   Musculoskeletal:      Comments: No synovitis, dactylitis, enthesitis, effusions     Neurological: He is alert.   Skin: Skin is warm and dry. No rash noted.   No skin thickening, telangiectasias, calcinosis, psoriasiform lesions, lupoid lesions         No data to display     Assessment:     1. Sjogren's syndrome, with unspecified organ involvement    2. Long-term use of Plaquenil    3. Encounter for long-term (current) drug use      This is a 78-year-old man with history of inguinal hernia, sulfa allergy (tinnitus to Bactrim), PVCs, rare esophageal spasms, DDD, mild tinnitus, and Sjogren's syndrome diagnosed in 2005 on Restasis, Evoxac (2-3 times per day), and Plaquenil 200mg every other day. Patient tried discontinuing Plaquenil but developed worsening sicca symptoms. He is now back on Plaquenil and denies fevers, night sweats, unintentional  weight loss, lymphadenopathy. His sicca symptoms are well controlled.     Plan:     Problem List Items Addressed This Visit          Immunology/Multi System    Gougerout-Sjoegren syndrome - Primary    Relevant Orders    C-Reactive Protein    CBC Auto Differential    Creatinine, Serum    ALT (SGPT)    AST (SGOT)     Other Visit Diagnoses       Long-term use of Plaquenil        Relevant Orders    C-Reactive Protein    CBC Auto Differential    Creatinine, Serum    ALT (SGPT)    AST (SGOT)    Encounter for long-term (current) drug use        Relevant Orders    C-Reactive Protein    CBC Auto Differential    Creatinine, Serum    ALT (SGPT)    AST (SGOT)          - Plaquenil 200mg every other day  - Continue Evoxac 2-3 times per day  - Patient states that he gets HCQ eye exams twice a year (Dr Nascimento)    Follow up in 6 months    30 minutes of total time spent on the encounter, which includes face to face time and non-face to face time preparing to see the patient (eg, review of tests), Obtaining and/or reviewing separately obtained history, Documenting clinical information in the electronic or other health record, Independently interpreting results (not separately reported) and communicating results to the patient/family/caregiver, or Care coordination (not separately reported).     This note was prepared with [employer] Direct voice recognition transcription software. Garbled syntax, mangled pronouns, and other bizarre constructions may be attributed to that software system       Reema Singh M.D.  Rheumatology Dept  [place], [place]

## 2024-09-04 ENCOUNTER — OFFICE VISIT (OUTPATIENT)
Dept: RHEUMATOLOGY | Facility: CLINIC | Age: 79
End: 2024-09-04
Payer: MEDICARE

## 2024-09-04 VITALS
WEIGHT: 202.63 LBS | HEART RATE: 70 BPM | BODY MASS INDEX: 27.48 KG/M2 | DIASTOLIC BLOOD PRESSURE: 81 MMHG | SYSTOLIC BLOOD PRESSURE: 135 MMHG

## 2024-09-04 DIAGNOSIS — Z79.899 LONG-TERM USE OF PLAQUENIL: ICD-10-CM

## 2024-09-04 DIAGNOSIS — Z79.899 ENCOUNTER FOR LONG-TERM (CURRENT) DRUG USE: ICD-10-CM

## 2024-09-04 DIAGNOSIS — M35.00 SJOGREN'S SYNDROME, WITH UNSPECIFIED ORGAN INVOLVEMENT: Primary | ICD-10-CM

## 2024-09-04 PROCEDURE — 1126F AMNT PAIN NOTED NONE PRSNT: CPT | Mod: CPTII,S$GLB,, | Performed by: STUDENT IN AN ORGANIZED HEALTH CARE EDUCATION/TRAINING PROGRAM

## 2024-09-04 PROCEDURE — 99214 OFFICE O/P EST MOD 30 MIN: CPT | Mod: S$GLB,,, | Performed by: STUDENT IN AN ORGANIZED HEALTH CARE EDUCATION/TRAINING PROGRAM

## 2024-09-04 PROCEDURE — 1160F RVW MEDS BY RX/DR IN RCRD: CPT | Mod: CPTII,S$GLB,, | Performed by: STUDENT IN AN ORGANIZED HEALTH CARE EDUCATION/TRAINING PROGRAM

## 2024-09-04 PROCEDURE — 3075F SYST BP GE 130 - 139MM HG: CPT | Mod: CPTII,S$GLB,, | Performed by: STUDENT IN AN ORGANIZED HEALTH CARE EDUCATION/TRAINING PROGRAM

## 2024-09-04 PROCEDURE — 1101F PT FALLS ASSESS-DOCD LE1/YR: CPT | Mod: CPTII,S$GLB,, | Performed by: STUDENT IN AN ORGANIZED HEALTH CARE EDUCATION/TRAINING PROGRAM

## 2024-09-04 PROCEDURE — 3079F DIAST BP 80-89 MM HG: CPT | Mod: CPTII,S$GLB,, | Performed by: STUDENT IN AN ORGANIZED HEALTH CARE EDUCATION/TRAINING PROGRAM

## 2024-09-04 PROCEDURE — 3288F FALL RISK ASSESSMENT DOCD: CPT | Mod: CPTII,S$GLB,, | Performed by: STUDENT IN AN ORGANIZED HEALTH CARE EDUCATION/TRAINING PROGRAM

## 2024-09-04 PROCEDURE — 99999 PR PBB SHADOW E&M-EST. PATIENT-LVL III: CPT | Mod: PBBFAC,,, | Performed by: STUDENT IN AN ORGANIZED HEALTH CARE EDUCATION/TRAINING PROGRAM

## 2024-09-04 PROCEDURE — 1159F MED LIST DOCD IN RCRD: CPT | Mod: CPTII,S$GLB,, | Performed by: STUDENT IN AN ORGANIZED HEALTH CARE EDUCATION/TRAINING PROGRAM

## 2024-09-04 RX ORDER — DOXYCYCLINE HYCLATE 100 MG
100 TABLET ORAL 2 TIMES DAILY
COMMUNITY
Start: 2024-08-09

## 2024-09-04 ASSESSMENT — ROUTINE ASSESSMENT OF PATIENT INDEX DATA (RAPID3)
TOTAL RAPID3 SCORE: 0
MDHAQ FUNCTION SCORE: 0
FATIGUE SCORE: 0
PSYCHOLOGICAL DISTRESS SCORE: 0
PAIN SCORE: 0
PATIENT GLOBAL ASSESSMENT SCORE: 0

## 2024-09-23 PROBLEM — K21.00 GASTROESOPHAGEAL REFLUX DISEASE WITH ESOPHAGITIS WITHOUT HEMORRHAGE: Status: ACTIVE | Noted: 2024-09-23

## 2024-09-23 PROBLEM — I10 MODERATE HYPERTENSION: Status: ACTIVE | Noted: 2024-09-23

## 2024-09-23 PROBLEM — E07.9 THYROID DISORDER: Status: ACTIVE | Noted: 2024-09-23

## 2024-09-23 PROBLEM — N41.1 CHRONIC PROSTATITIS: Status: ACTIVE | Noted: 2024-09-23

## 2024-10-16 DIAGNOSIS — M35.00 SJOGREN'S SYNDROME, WITH UNSPECIFIED ORGAN INVOLVEMENT: ICD-10-CM

## 2024-10-17 RX ORDER — CEVIMELINE HYDROCHLORIDE 30 MG/1
CAPSULE ORAL
Qty: 270 CAPSULE | Refills: 3 | Status: SHIPPED | OUTPATIENT
Start: 2024-10-17

## 2024-10-29 ENCOUNTER — PATIENT MESSAGE (OUTPATIENT)
Dept: RHEUMATOLOGY | Facility: CLINIC | Age: 79
End: 2024-10-29
Payer: MEDICARE

## 2024-10-29 DIAGNOSIS — M35.00 SJOGREN'S SYNDROME, WITH UNSPECIFIED ORGAN INVOLVEMENT: ICD-10-CM

## 2024-10-30 ENCOUNTER — TELEPHONE (OUTPATIENT)
Dept: RHEUMATOLOGY | Facility: CLINIC | Age: 79
End: 2024-10-30
Payer: MEDICARE

## 2024-10-31 DIAGNOSIS — M35.00 SJOGREN'S SYNDROME, WITH UNSPECIFIED ORGAN INVOLVEMENT: ICD-10-CM

## 2024-10-31 RX ORDER — CEVIMELINE HYDROCHLORIDE 30 MG/1
CAPSULE ORAL
Qty: 270 CAPSULE | Refills: 3 | Status: SHIPPED | OUTPATIENT
Start: 2024-10-31 | End: 2024-11-01

## 2024-11-01 DIAGNOSIS — M35.00 SJOGREN'S SYNDROME, WITH UNSPECIFIED ORGAN INVOLVEMENT: ICD-10-CM

## 2024-11-01 RX ORDER — CEVIMELINE HYDROCHLORIDE 30 MG/1
30 CAPSULE ORAL 3 TIMES DAILY
Qty: 270 CAPSULE | Refills: 3 | Status: SHIPPED | OUTPATIENT
Start: 2024-11-01 | End: 2024-11-01 | Stop reason: SDUPTHER

## 2024-11-01 RX ORDER — CEVIMELINE HYDROCHLORIDE 30 MG/1
30 CAPSULE ORAL 3 TIMES DAILY
Qty: 270 CAPSULE | Refills: 3 | Status: SHIPPED | OUTPATIENT
Start: 2024-11-01 | End: 2025-11-01

## 2024-11-04 ENCOUNTER — PATIENT MESSAGE (OUTPATIENT)
Dept: RHEUMATOLOGY | Facility: CLINIC | Age: 79
End: 2024-11-04
Payer: MEDICARE

## 2024-11-08 ENCOUNTER — PATIENT MESSAGE (OUTPATIENT)
Dept: RHEUMATOLOGY | Facility: CLINIC | Age: 79
End: 2024-11-08
Payer: MEDICARE

## 2025-01-30 NOTE — TELEPHONE ENCOUNTER
Pharmacy requesting refill on Plaquenil 200mg   Pt's LOV 9/4/2024  Pt's NOV 3/6/2025  Medication pending

## 2025-01-31 RX ORDER — HYDROXYCHLOROQUINE SULFATE 200 MG/1
TABLET, FILM COATED ORAL
Qty: 90 TABLET | Refills: 3 | Status: SHIPPED | OUTPATIENT
Start: 2025-01-31

## 2025-03-03 ENCOUNTER — RESULTS FOLLOW-UP (OUTPATIENT)
Dept: RHEUMATOLOGY | Facility: CLINIC | Age: 80
End: 2025-03-03

## 2025-03-03 NOTE — PROGRESS NOTES
Subjective:      Patient ID: Nigel Marcus Jr. is a 79 y.o. male.    Chief Complaint: Disease Management    HPI    Rheumatologic History:      - Diagnosis/es:              - Sjogren's syndrome diagnosed in 2005  - Positive serologies: -  - Negative serologies: SSA  - Infectious screening labs: -  - Imaging: -  - Previous Treatments: -  - Current Treatments:   - Restasis  - Evoxac (2-3 times per day)  - Plaquenil 200mg every other day (2005- ) (patient tried discontinuing Plaquenil but developed worsening sicca symptoms)  Interval History:   He reports stable sicca symptoms. He denies unexplained fevers, unintentional weight loss, rashes, oral/nasal ulcers, chest pain, shortness of breath, abdominal pain, joint pain and swelling.    Objective:   /81 (BP Location: Right arm, Patient Position: Sitting)   Pulse 74   Wt 91.7 kg (202 lb 2.6 oz)   BMI 27.42 kg/m²   Physical Exam   Constitutional: normal appearance.   HENT:   Head: Normocephalic and atraumatic.   Cardiovascular: Normal rate, regular rhythm and normal heart sounds.   Pulmonary/Chest: Effort normal and breath sounds normal.   Musculoskeletal:      Comments: No synovitis, dactylitis, enthesitis, effusions     Neurological: He is alert.   Skin: Skin is warm and dry. No rash noted.   No skin thickening, telangiectasias, calcinosis, psoriasiform lesions, lupoid lesions       No data to display     Labs (3/3/25)   CBC WBC, HGB, PLT WNL  CR, AST, ALT WNL   CRP WNL    Assessment:     1. Sjogren's syndrome, with unspecified organ involvement    2. Long-term use of Plaquenil    3. Osteoporosis screening    4. Other specified disorders of bone density and structure, multiple sites        This is a 79-year-old man with history of inguinal hernia, sulfa allergy (tinnitus to Bactrim), PVCs, rare esophageal spasms, DDD, mild tinnitus, and Sjogren's syndrome diagnosed in 2005 on Restasis, Evoxac (2-3 times per day), and Plaquenil 200mg every other day. He is doing  well. Continue current medication.    Plan:     Problem List Items Addressed This Visit          Immunology/Multi System    Gougerout-Sjoegren syndrome - Primary    Relevant Orders    Anti-DNA Ab, Double-Stranded    C3 Complement    C4 Complement    CBC Auto Differential    Sedimentation rate    Protein/Creatinine Ratio, Urine    C-Reactive Protein    Comprehensive Metabolic Panel    Urinalysis     Other Visit Diagnoses         Long-term use of Plaquenil        Relevant Orders    Anti-DNA Ab, Double-Stranded    C3 Complement    C4 Complement    CBC Auto Differential    Sedimentation rate    Protein/Creatinine Ratio, Urine    C-Reactive Protein    Comprehensive Metabolic Panel    Urinalysis      Osteoporosis screening        Relevant Orders    DXA Bone Density Axial Skeleton 1 or more sites      Other specified disorders of bone density and structure, multiple sites        Relevant Orders    DXA Bone Density Axial Skeleton 1 or more sites          - Plaquenil 200mg every other day  - Continue Evoxac 2-3 times per day  - Patient states that he gets [facility] eye exams twice a year (Dr Nascimento)    Follow up in 6 months or sooner if needed    30 minutes of total time spent on the encounter, which includes face to face time and non-face to face time preparing to see the patient (eg, review of tests), Obtaining and/or reviewing separately obtained history, Documenting clinical information in the electronic or other health record, Independently interpreting results (not separately reported) and communicating results to the patient/family/caregiver, or Care coordination (not separately reported).     This note was prepared with [employer] Direct voice recognition transcription software. Garbled syntax, mangled pronouns, and other bizarre constructions may be attributed to that software system       Reema Singh M.D.  Rheumatology Dept  [place], [place]

## 2025-03-06 ENCOUNTER — OFFICE VISIT (OUTPATIENT)
Dept: RHEUMATOLOGY | Facility: CLINIC | Age: 80
End: 2025-03-06
Payer: MEDICARE

## 2025-03-06 VITALS
BODY MASS INDEX: 27.42 KG/M2 | HEART RATE: 74 BPM | SYSTOLIC BLOOD PRESSURE: 124 MMHG | WEIGHT: 202.19 LBS | DIASTOLIC BLOOD PRESSURE: 81 MMHG

## 2025-03-06 DIAGNOSIS — Z13.820 OSTEOPOROSIS SCREENING: ICD-10-CM

## 2025-03-06 DIAGNOSIS — Z79.899 LONG-TERM USE OF PLAQUENIL: ICD-10-CM

## 2025-03-06 DIAGNOSIS — M35.00 SJOGREN'S SYNDROME, WITH UNSPECIFIED ORGAN INVOLVEMENT: Primary | ICD-10-CM

## 2025-03-06 DIAGNOSIS — M85.89 OTHER SPECIFIED DISORDERS OF BONE DENSITY AND STRUCTURE, MULTIPLE SITES: ICD-10-CM

## 2025-03-06 PROCEDURE — 3079F DIAST BP 80-89 MM HG: CPT | Mod: CPTII,S$GLB,, | Performed by: STUDENT IN AN ORGANIZED HEALTH CARE EDUCATION/TRAINING PROGRAM

## 2025-03-06 PROCEDURE — 1101F PT FALLS ASSESS-DOCD LE1/YR: CPT | Mod: CPTII,S$GLB,, | Performed by: STUDENT IN AN ORGANIZED HEALTH CARE EDUCATION/TRAINING PROGRAM

## 2025-03-06 PROCEDURE — 1159F MED LIST DOCD IN RCRD: CPT | Mod: CPTII,S$GLB,, | Performed by: STUDENT IN AN ORGANIZED HEALTH CARE EDUCATION/TRAINING PROGRAM

## 2025-03-06 PROCEDURE — 3288F FALL RISK ASSESSMENT DOCD: CPT | Mod: CPTII,S$GLB,, | Performed by: STUDENT IN AN ORGANIZED HEALTH CARE EDUCATION/TRAINING PROGRAM

## 2025-03-06 PROCEDURE — 99214 OFFICE O/P EST MOD 30 MIN: CPT | Mod: S$GLB,,, | Performed by: STUDENT IN AN ORGANIZED HEALTH CARE EDUCATION/TRAINING PROGRAM

## 2025-03-06 PROCEDURE — 3074F SYST BP LT 130 MM HG: CPT | Mod: CPTII,S$GLB,, | Performed by: STUDENT IN AN ORGANIZED HEALTH CARE EDUCATION/TRAINING PROGRAM

## 2025-03-06 PROCEDURE — 1160F RVW MEDS BY RX/DR IN RCRD: CPT | Mod: CPTII,S$GLB,, | Performed by: STUDENT IN AN ORGANIZED HEALTH CARE EDUCATION/TRAINING PROGRAM

## 2025-03-06 PROCEDURE — 99999 PR PBB SHADOW E&M-EST. PATIENT-LVL III: CPT | Mod: PBBFAC,,, | Performed by: STUDENT IN AN ORGANIZED HEALTH CARE EDUCATION/TRAINING PROGRAM

## 2025-03-06 PROCEDURE — 1126F AMNT PAIN NOTED NONE PRSNT: CPT | Mod: CPTII,S$GLB,, | Performed by: STUDENT IN AN ORGANIZED HEALTH CARE EDUCATION/TRAINING PROGRAM

## 2025-03-06 ASSESSMENT — ROUTINE ASSESSMENT OF PATIENT INDEX DATA (RAPID3)
FATIGUE SCORE: 0
TOTAL RAPID3 SCORE: 0
PATIENT GLOBAL ASSESSMENT SCORE: 0
MDHAQ FUNCTION SCORE: 0
PSYCHOLOGICAL DISTRESS SCORE: 0
PAIN SCORE: 0

## 2025-03-31 ENCOUNTER — PATIENT MESSAGE (OUTPATIENT)
Dept: RHEUMATOLOGY | Facility: CLINIC | Age: 80
End: 2025-03-31
Payer: MEDICARE